# Patient Record
Sex: MALE | Race: WHITE | NOT HISPANIC OR LATINO | Employment: FULL TIME | URBAN - METROPOLITAN AREA
[De-identification: names, ages, dates, MRNs, and addresses within clinical notes are randomized per-mention and may not be internally consistent; named-entity substitution may affect disease eponyms.]

---

## 2017-01-27 ENCOUNTER — APPOINTMENT (OUTPATIENT)
Dept: GENERAL RADIOLOGY | Facility: HOSPITAL | Age: 37
End: 2017-01-27

## 2017-01-27 ENCOUNTER — HOSPITAL ENCOUNTER (EMERGENCY)
Facility: HOSPITAL | Age: 37
Discharge: LEFT BEFORE DISCHARGE | End: 2017-01-27

## 2017-01-27 VITALS
OXYGEN SATURATION: 94 % | SYSTOLIC BLOOD PRESSURE: 147 MMHG | WEIGHT: 275 LBS | HEART RATE: 92 BPM | TEMPERATURE: 97.3 F | BODY MASS INDEX: 33.49 KG/M2 | HEIGHT: 76 IN | RESPIRATION RATE: 20 BRPM | DIASTOLIC BLOOD PRESSURE: 86 MMHG

## 2017-01-27 LAB
ALBUMIN SERPL-MCNC: 4.6 G/DL (ref 3.5–5.2)
ALBUMIN/GLOB SERPL: 1.6 G/DL
ALP SERPL-CCNC: 78 U/L (ref 39–117)
ALT SERPL W P-5'-P-CCNC: 27 U/L (ref 1–41)
ANION GAP SERPL CALCULATED.3IONS-SCNC: 10.2 MMOL/L
AST SERPL-CCNC: 25 U/L (ref 1–40)
BASOPHILS # BLD AUTO: 0.05 10*3/MM3 (ref 0–0.2)
BASOPHILS NFR BLD AUTO: 0.5 % (ref 0–1.5)
BILIRUB SERPL-MCNC: 0.4 MG/DL (ref 0.1–1.2)
BUN BLD-MCNC: 18 MG/DL (ref 6–20)
BUN/CREAT SERPL: 25 (ref 7–25)
CALCIUM SPEC-SCNC: 9.7 MG/DL (ref 8.6–10.5)
CHLORIDE SERPL-SCNC: 104 MMOL/L (ref 98–107)
CO2 SERPL-SCNC: 28.8 MMOL/L (ref 22–29)
CREAT BLD-MCNC: 0.72 MG/DL (ref 0.76–1.27)
DEPRECATED RDW RBC AUTO: 44.8 FL (ref 37–54)
EOSINOPHIL # BLD AUTO: 0.34 10*3/MM3 (ref 0–0.7)
EOSINOPHIL NFR BLD AUTO: 3.4 % (ref 0.3–6.2)
ERYTHROCYTE [DISTWIDTH] IN BLOOD BY AUTOMATED COUNT: 13.3 % (ref 11.5–14.5)
GFR SERPL CREATININE-BSD FRML MDRD: 123 ML/MIN/1.73
GLOBULIN UR ELPH-MCNC: 2.8 GM/DL
GLUCOSE BLD-MCNC: 112 MG/DL (ref 65–99)
HCT VFR BLD AUTO: 43.4 % (ref 40.4–52.2)
HGB BLD-MCNC: 14.7 G/DL (ref 13.7–17.6)
HOLD SPECIMEN: NORMAL
IMM GRANULOCYTES # BLD: 0.03 10*3/MM3 (ref 0–0.03)
IMM GRANULOCYTES NFR BLD: 0.3 % (ref 0–0.5)
LYMPHOCYTES # BLD AUTO: 2.53 10*3/MM3 (ref 0.9–4.8)
LYMPHOCYTES NFR BLD AUTO: 25 % (ref 19.6–45.3)
MCH RBC QN AUTO: 31.3 PG (ref 27–32.7)
MCHC RBC AUTO-ENTMCNC: 33.9 G/DL (ref 32.6–36.4)
MCV RBC AUTO: 92.3 FL (ref 79.8–96.2)
MONOCYTES # BLD AUTO: 0.52 10*3/MM3 (ref 0.2–1.2)
MONOCYTES NFR BLD AUTO: 5.1 % (ref 5–12)
NEUTROPHILS # BLD AUTO: 6.65 10*3/MM3 (ref 1.9–8.1)
NEUTROPHILS NFR BLD AUTO: 65.7 % (ref 42.7–76)
PLATELET # BLD AUTO: 228 10*3/MM3 (ref 140–500)
PMV BLD AUTO: 9.8 FL (ref 6–12)
POTASSIUM BLD-SCNC: 4.3 MMOL/L (ref 3.5–5.2)
PROT SERPL-MCNC: 7.4 G/DL (ref 6–8.5)
RBC # BLD AUTO: 4.7 10*6/MM3 (ref 4.6–6)
SODIUM BLD-SCNC: 143 MMOL/L (ref 136–145)
TROPONIN T SERPL-MCNC: <0.01 NG/ML (ref 0–0.03)
WBC NRBC COR # BLD: 10.12 10*3/MM3 (ref 4.5–10.7)
WHOLE BLOOD HOLD SPECIMEN: NORMAL

## 2017-01-27 PROCEDURE — 36415 COLL VENOUS BLD VENIPUNCTURE: CPT

## 2017-01-27 PROCEDURE — 80053 COMPREHEN METABOLIC PANEL: CPT

## 2017-01-27 PROCEDURE — 84484 ASSAY OF TROPONIN QUANT: CPT

## 2017-01-27 PROCEDURE — 93010 ELECTROCARDIOGRAM REPORT: CPT | Performed by: INTERNAL MEDICINE

## 2017-01-27 PROCEDURE — 71020 HC CHEST PA AND LATERAL: CPT

## 2017-01-27 PROCEDURE — 85025 COMPLETE CBC W/AUTO DIFF WBC: CPT

## 2017-01-27 PROCEDURE — 93005 ELECTROCARDIOGRAM TRACING: CPT

## 2017-01-27 PROCEDURE — 99211 OFF/OP EST MAY X REQ PHY/QHP: CPT

## 2017-01-27 RX ORDER — SODIUM CHLORIDE 0.9 % (FLUSH) 0.9 %
10 SYRINGE (ML) INJECTION AS NEEDED
Status: DISCONTINUED | OUTPATIENT
Start: 2017-01-27 | End: 2017-01-27 | Stop reason: HOSPADM

## 2017-01-27 RX ORDER — ASPIRIN 325 MG
325 TABLET ORAL ONCE
Status: DISCONTINUED | OUTPATIENT
Start: 2017-01-27 | End: 2017-01-27 | Stop reason: HOSPADM

## 2017-02-02 ENCOUNTER — HOSPITAL ENCOUNTER (EMERGENCY)
Facility: HOSPITAL | Age: 37
Discharge: HOME | End: 2017-02-02
Attending: EMERGENCY MEDICINE | Admitting: EMERGENCY MEDICINE

## 2017-02-02 ENCOUNTER — APPOINTMENT (OUTPATIENT)
Dept: GENERAL RADIOLOGY | Facility: HOSPITAL | Age: 37
End: 2017-02-02

## 2017-02-02 VITALS
WEIGHT: 270 LBS | DIASTOLIC BLOOD PRESSURE: 86 MMHG | HEART RATE: 73 BPM | TEMPERATURE: 97.4 F | HEIGHT: 76 IN | BODY MASS INDEX: 32.88 KG/M2 | RESPIRATION RATE: 18 BRPM | SYSTOLIC BLOOD PRESSURE: 123 MMHG | OXYGEN SATURATION: 96 %

## 2017-02-02 DIAGNOSIS — R07.89 ATYPICAL CHEST PAIN: Primary | ICD-10-CM

## 2017-02-02 DIAGNOSIS — R53.1 GENERALIZED WEAKNESS: ICD-10-CM

## 2017-02-02 LAB
ALBUMIN SERPL-MCNC: 4.3 G/DL (ref 3.5–5.2)
ALBUMIN/GLOB SERPL: 1.8 G/DL
ALP SERPL-CCNC: 90 U/L (ref 39–117)
ALT SERPL W P-5'-P-CCNC: 34 U/L (ref 1–41)
ANION GAP SERPL CALCULATED.3IONS-SCNC: 13.6 MMOL/L
AST SERPL-CCNC: 35 U/L (ref 1–40)
BASOPHILS # BLD AUTO: 0.05 10*3/MM3 (ref 0–0.2)
BASOPHILS NFR BLD AUTO: 0.7 % (ref 0–1.5)
BILIRUB SERPL-MCNC: 0.3 MG/DL (ref 0.1–1.2)
BUN BLD-MCNC: 16 MG/DL (ref 6–20)
BUN/CREAT SERPL: 30.2 (ref 7–25)
CALCIUM SPEC-SCNC: 8.7 MG/DL (ref 8.6–10.5)
CHLORIDE SERPL-SCNC: 103 MMOL/L (ref 98–107)
CO2 SERPL-SCNC: 24.4 MMOL/L (ref 22–29)
CREAT BLD-MCNC: 0.53 MG/DL (ref 0.76–1.27)
D DIMER PPP FEU-MCNC: 0.35 MCGFEU/ML (ref 0–0.49)
DEPRECATED RDW RBC AUTO: 41.8 FL (ref 37–54)
EOSINOPHIL # BLD AUTO: 0.27 10*3/MM3 (ref 0–0.7)
EOSINOPHIL NFR BLD AUTO: 3.6 % (ref 0.3–6.2)
ERYTHROCYTE [DISTWIDTH] IN BLOOD BY AUTOMATED COUNT: 12.9 % (ref 11.5–14.5)
GFR SERPL CREATININE-BSD FRML MDRD: >150 ML/MIN/1.73
GLOBULIN UR ELPH-MCNC: 2.4 GM/DL
GLUCOSE BLD-MCNC: 104 MG/DL (ref 65–99)
HCT VFR BLD AUTO: 37.8 % (ref 40.4–52.2)
HGB BLD-MCNC: 13.2 G/DL (ref 13.7–17.6)
HOLD SPECIMEN: NORMAL
HOLD SPECIMEN: NORMAL
IMM GRANULOCYTES # BLD: 0 10*3/MM3 (ref 0–0.03)
IMM GRANULOCYTES NFR BLD: 0 % (ref 0–0.5)
LYMPHOCYTES # BLD AUTO: 2.85 10*3/MM3 (ref 0.9–4.8)
LYMPHOCYTES NFR BLD AUTO: 38.3 % (ref 19.6–45.3)
MCH RBC QN AUTO: 31.3 PG (ref 27–32.7)
MCHC RBC AUTO-ENTMCNC: 34.9 G/DL (ref 32.6–36.4)
MCV RBC AUTO: 89.6 FL (ref 79.8–96.2)
MONOCYTES # BLD AUTO: 0.7 10*3/MM3 (ref 0.2–1.2)
MONOCYTES NFR BLD AUTO: 9.4 % (ref 5–12)
NEUTROPHILS # BLD AUTO: 3.58 10*3/MM3 (ref 1.9–8.1)
NEUTROPHILS NFR BLD AUTO: 48 % (ref 42.7–76)
PLATELET # BLD AUTO: 225 10*3/MM3 (ref 140–500)
PMV BLD AUTO: 10 FL (ref 6–12)
POTASSIUM BLD-SCNC: 3.7 MMOL/L (ref 3.5–5.2)
PROT SERPL-MCNC: 6.7 G/DL (ref 6–8.5)
RBC # BLD AUTO: 4.22 10*6/MM3 (ref 4.6–6)
SODIUM BLD-SCNC: 141 MMOL/L (ref 136–145)
TROPONIN T SERPL-MCNC: <0.01 NG/ML (ref 0–0.03)
WBC NRBC COR # BLD: 7.45 10*3/MM3 (ref 4.5–10.7)
WHOLE BLOOD HOLD SPECIMEN: NORMAL
WHOLE BLOOD HOLD SPECIMEN: NORMAL

## 2017-02-02 PROCEDURE — 84484 ASSAY OF TROPONIN QUANT: CPT | Performed by: EMERGENCY MEDICINE

## 2017-02-02 PROCEDURE — 71020 HC CHEST PA AND LATERAL: CPT

## 2017-02-02 PROCEDURE — 25010000002 KETOROLAC TROMETHAMINE PER 15 MG: Performed by: EMERGENCY MEDICINE

## 2017-02-02 PROCEDURE — 96374 THER/PROPH/DIAG INJ IV PUSH: CPT

## 2017-02-02 PROCEDURE — 85025 COMPLETE CBC W/AUTO DIFF WBC: CPT | Performed by: EMERGENCY MEDICINE

## 2017-02-02 PROCEDURE — 99284 EMERGENCY DEPT VISIT MOD MDM: CPT

## 2017-02-02 PROCEDURE — 93010 ELECTROCARDIOGRAM REPORT: CPT | Performed by: INTERNAL MEDICINE

## 2017-02-02 PROCEDURE — 93005 ELECTROCARDIOGRAM TRACING: CPT

## 2017-02-02 PROCEDURE — 85379 FIBRIN DEGRADATION QUANT: CPT | Performed by: EMERGENCY MEDICINE

## 2017-02-02 PROCEDURE — 80053 COMPREHEN METABOLIC PANEL: CPT | Performed by: EMERGENCY MEDICINE

## 2017-02-02 RX ORDER — KETOROLAC TROMETHAMINE 30 MG/ML
30 INJECTION, SOLUTION INTRAMUSCULAR; INTRAVENOUS ONCE
Status: COMPLETED | OUTPATIENT
Start: 2017-02-02 | End: 2017-02-02

## 2017-02-02 RX ORDER — ASPIRIN 325 MG
325 TABLET ORAL ONCE
Status: DISCONTINUED | OUTPATIENT
Start: 2017-02-02 | End: 2017-02-02

## 2017-02-02 RX ORDER — SODIUM CHLORIDE 0.9 % (FLUSH) 0.9 %
10 SYRINGE (ML) INJECTION AS NEEDED
Status: DISCONTINUED | OUTPATIENT
Start: 2017-02-02 | End: 2017-02-02 | Stop reason: HOSPADM

## 2017-02-02 RX ORDER — ASPIRIN 81 MG/1
81 TABLET, CHEWABLE ORAL DAILY
COMMUNITY
End: 2019-01-12 | Stop reason: SDUPTHER

## 2017-02-02 RX ADMIN — KETOROLAC TROMETHAMINE 30 MG: 30 INJECTION, SOLUTION INTRAMUSCULAR at 05:34

## 2017-02-02 RX ADMIN — Medication 10 ML: at 05:36

## 2017-02-02 NOTE — ED PROVIDER NOTES
" EMERGENCY DEPARTMENT ENCOUNTER    CHIEF COMPLAINT  Chief Complaint: Chest pain  History given by: patient  History limited by: n/a  Room Number: 12/12  PMD: Rosenberg Acosta Reyes, MD      HPI:  Pt is a 36 y.o. male who presents complaining of chest began about 4 hours ago. Pt state that that pain is worse with deep inspiration. Pt states that he took NTG x3 and 324 mg of ASA prior to arrival. Pt also complains of fatigue and SOA. He describes the SOA as the inability to take a deep breath. Pt states that he has chronic chest pain, but the pain tonight is different. Pt reports a hx of cardiomegaly and aortic valve insuffiencey.     Duration:  4 hours  Onset: sudden  Timing: contant  Location: left chest  Radiation: none  Quality: \"pain\"  Intensity/Severity: moderate   Progression: unchanged   Associated Symptoms: Fatgue   Aggravating Factors: none  Alleviating Factors: none  Previous Episodes: chronic chest pain  Treatment before arrival: none    PAST MEDICAL HISTORY  Active Ambulatory Problems     Diagnosis Date Noted   • No Active Ambulatory Problems     Resolved Ambulatory Problems     Diagnosis Date Noted   • No Resolved Ambulatory Problems     Past Medical History   Diagnosis Date   • Aortic valve insufficiency    • Bipolar disorder    • Cardiomegaly    • Murmur        PAST SURGICAL HISTORY  Past Surgical History   Procedure Laterality Date   • No past surgeries         FAMILY HISTORY  History reviewed. No pertinent family history.    SOCIAL HISTORY  Social History     Social History   • Marital status:      Spouse name: N/A   • Number of children: N/A   • Years of education: N/A     Occupational History   • Not on file.     Social History Main Topics   • Smoking status: Current Every Day Smoker     Packs/day: 0.50     Types: Cigarettes   • Smokeless tobacco: Not on file   • Alcohol use No   • Drug use: No   • Sexual activity: Defer     Other Topics Concern   • Not on file     Social History Narrative "       ALLERGIES  Review of patient's allergies indicates no known allergies.    REVIEW OF SYSTEMS  Review of Systems   Constitutional: Positive for fatigue. Negative for chills and fever.   HENT: Negative for congestion and sore throat.    Eyes: Negative.    Respiratory: Negative for cough and shortness of breath.    Cardiovascular: Positive for chest pain. Negative for leg swelling.   Gastrointestinal: Negative for abdominal pain, diarrhea and vomiting.   Genitourinary: Negative for difficulty urinating and dysuria.   Musculoskeletal: Negative for back pain and neck pain.   Skin: Negative for rash and wound.   Allergic/Immunologic: Negative.    Neurological: Negative for dizziness, weakness, numbness and headaches.   Psychiatric/Behavioral: Negative.    All other systems reviewed and are negative.      PHYSICAL EXAM  ED Triage Vitals   Temp Heart Rate Resp BP SpO2   02/02/17 0428 02/02/17 0418 02/02/17 0418 02/02/17 0418 02/02/17 0418   97.4 °F (36.3 °C) 93 18 138/89 95 %      Temp src Heart Rate Source Patient Position BP Location FiO2 (%)   02/02/17 0428 02/02/17 0418 02/02/17 0418 02/02/17 0418 --   Tympanic Monitor Lying Right arm        Physical Exam   Constitutional: He is oriented to person, place, and time and well-developed, well-nourished, and in no distress.   HENT:   Head: Normocephalic and atraumatic.   Eyes: EOM are normal. Pupils are equal, round, and reactive to light.   Neck: Normal range of motion. Neck supple.   Cardiovascular: Normal rate, regular rhythm and normal heart sounds.    Pulmonary/Chest: Effort normal and breath sounds normal. No respiratory distress.   Abdominal: Soft. There is no tenderness. There is no rebound and no guarding.   Musculoskeletal: Normal range of motion. He exhibits no edema.   Neurological: He is alert and oriented to person, place, and time. He has normal sensation and normal strength.   Skin: Skin is warm and dry.   Psychiatric: Mood and affect normal.   Nursing  note and vitals reviewed.      LAB RESULTS  Lab Results (last 24 hours)     Procedure Component Value Units Date/Time    CBC & Differential [[id number]] Collected:  02/02/17 0423    Specimen:  Blood Updated:  02/02/17 0441    Narrative:       The following orders were created for panel order CBC & Differential.  Procedure                               Abnormality         Status                     ---------                               -----------         ------                     CBC Auto Differential[[id number]]         Abnormal            Final result                 Please view results for these tests on the individual orders.    Comprehensive Metabolic Panel [[id number]]  (Abnormal) Collected:  02/02/17 0423    Specimen:  Blood Updated:  02/02/17 0458     Glucose 104 (H) mg/dL      BUN 16 mg/dL      Creatinine 0.53 (L) mg/dL      Sodium 141 mmol/L      Potassium 3.7 mmol/L      Chloride 103 mmol/L      CO2 24.4 mmol/L      Calcium 8.7 mg/dL      Total Protein 6.7 g/dL      Albumin 4.30 g/dL      ALT (SGPT) 34 U/L      AST (SGOT) 35 U/L      Alkaline Phosphatase 90 U/L      Total Bilirubin 0.3 mg/dL      eGFR Non African Amer >150 mL/min/1.73      Globulin 2.4 gm/dL      A/G Ratio 1.8 g/dL      BUN/Creatinine Ratio 30.2 (H)      Anion Gap 13.6 mmol/L     Troponin [[id number]]  (Normal) Collected:  02/02/17 0423    Specimen:  Blood Updated:  02/02/17 0458     Troponin T <0.010 ng/mL     Narrative:       Troponin T Reference Ranges:  Less than 0.03 ng/mL:    Negative for AMI  0.03 to 0.09 ng/mL:      Indeterminant for AMI  Greater than 0.09 ng/mL: Positive for AMI    CBC Auto Differential [[id number]]  (Abnormal) Collected:  02/02/17 0423    Specimen:  Blood Updated:  02/02/17 0441     WBC 7.45 10*3/mm3      RBC 4.22 (L) 10*6/mm3      Hemoglobin 13.2 (L) g/dL      Hematocrit 37.8 (L) %      MCV 89.6 fL      MCH 31.3 pg      MCHC 34.9 g/dL      RDW 12.9 %      RDW-SD 41.8 fl      MPV 10.0 fL      Platelets 225 10*3/mm3       Neutrophil % 48.0 %      Lymphocyte % 38.3 %      Monocyte % 9.4 %      Eosinophil % 3.6 %      Basophil % 0.7 %      Immature Grans % 0.0 %      Neutrophils, Absolute 3.58 10*3/mm3      Lymphocytes, Absolute 2.85 10*3/mm3      Monocytes, Absolute 0.70 10*3/mm3      Eosinophils, Absolute 0.27 10*3/mm3      Basophils, Absolute 0.05 10*3/mm3      Immature Grans, Absolute 0.00 10*3/mm3     D-dimer, Quantitative [[id number]]  (Normal) Collected:  02/02/17 0423    Specimen:  Blood Updated:  02/02/17 0523     D-Dimer, Quantitative 0.35 MCGFEU/mL     Narrative:       The Stago D-Dimer test has been cleared by the FDA for the exclusion of pulmonary embolism (PE). The Stago D-Dimer test, along with clinical findings, may be used to aid in the diagnosis of deep vein thrombosis (DVT).          I ordered the above labs and reviewed the results    RADIOLOGY  XR Chest 2 View   Preliminary Result   No acute findings.                    I ordered the above noted radiological studies. Interpreted by radiologist. Reviewed by me in PACS.       PROCEDURES  Procedures    EKG           EKG time: 04:11  Rhythm/Rate: NSR 95  P waves and MI: nml  QRS, axis: nml   ST and T waves: nml     Interpreted Contemporaneously by me, independently viewed  Unchanged compared to prior 1/27/17    PROGRESS AND CONSULTS  ED Course     04:23  Labs, CXR, and EKG ordered for further evaluation.     05:05  Ddimer ordered for further evaluation. Toradol ordered to treat pain.      05:55  Rechecked the patient who is in NAD and is resting comfortably. Advised pt that the workup in the ED shows NAD. Advised pt to f/u with Dr Reyes. Pt will be discharged. Pt understands and agrees with the plan, all questions answered.    MEDICAL DECISION MAKING  Results were reviewed/discussed with the patient and they were also made aware of online access. Pt also made aware that some labs, such as cultures, will not be resulted during ER visit and follow up with PMD is  necessary.     MDM  Number of Diagnoses or Management Options     Amount and/or Complexity of Data Reviewed  Clinical lab tests: reviewed and ordered (Troponin is <0.010, Ddimer is 0.35)  Tests in the radiology section of CPT®: reviewed and ordered (CXR shows NAD)  Tests in the medicine section of CPT®: ordered and reviewed (See EKG procedure note. )  Decide to obtain previous medical records or to obtain history from someone other than the patient: yes  Review and summarize past medical records: yes (Pt was last seen and dx with atypical chest pain in September 2016. )  Independent visualization of images, tracings, or specimens: yes    Patient Progress  Patient progress: stable         DIAGNOSIS  Final diagnoses:   Atypical chest pain   Generalized weakness       DISPOSITION  DISCHARGE    Patient discharged in stable condition.    Reviewed implications of results, diagnosis, meds, responsibility to follow up, warning signs and symptoms of possible worsening, potential complications and reasons to return to ER.    Patient/Family voiced understanding of above instructions.    Discussed plan for discharge, as there is no emergent indication for admission.  Pt/family is agreeable and understands need for follow up and repeat testing.  Pt is aware that discharge does not mean that nothing is wrong but it indicates no emergency is present that requires admission and they must continue care with follow-up as given below or physician of their choice.     FOLLOW-UP  Rosenberg Acosta Reyes, MD  [address] [place], [place] [id number] [phone]    Schedule an appointment as soon as possible for a visit           Medication List      Stop          CITALOPRAM HYDROBROMIDE PO       cyclobenzaprine 10 MG tablet   Commonly known as:  FLEXERIL       HYDROcodone-acetaminophen 5-325 MG per tablet   Commonly known as:  NORCO       LAMOTRIGINE PO       NON FORMULARY       PANTOPRAZOLE SODIUM PO           Latest Documented Vital  Signs:  As of 6:26 AM  BP- 123/86 HR- 73 Temp- 97.4 °F (36.3 °C) (Tympanic) O2 sat- 96%    --  Documentation assistance provided by bogdan Berger for Dr Corey.  Information recorded by the scribe was done at my direction and has been verified and validated by me.        Marisa Berger  02/02/17 0603       Gera Corey MD  02/02/17 0626

## 2017-02-02 NOTE — ED NOTES
L upper Chest pain radiating to L shoulder 8/10. 3 nitro tablets and 4 81mg ASA prior to arrival.        Narda Howard RN  02/02/17 0428

## 2017-04-11 ENCOUNTER — HOSPITAL ENCOUNTER (EMERGENCY)
Facility: HOSPITAL | Age: 37
Discharge: HOME | End: 2017-04-11
Attending: EMERGENCY MEDICINE | Admitting: EMERGENCY MEDICINE

## 2017-04-11 ENCOUNTER — APPOINTMENT (OUTPATIENT)
Dept: GENERAL RADIOLOGY | Facility: HOSPITAL | Age: 37
End: 2017-04-11

## 2017-04-11 VITALS
TEMPERATURE: 97.3 F | HEIGHT: 76 IN | HEART RATE: 102 BPM | SYSTOLIC BLOOD PRESSURE: 148 MMHG | RESPIRATION RATE: 16 BRPM | WEIGHT: 300 LBS | BODY MASS INDEX: 36.53 KG/M2 | OXYGEN SATURATION: 98 % | DIASTOLIC BLOOD PRESSURE: 90 MMHG

## 2017-04-11 DIAGNOSIS — S82.842A BIMALLEOLAR FRACTURE, LEFT, CLOSED, INITIAL ENCOUNTER: Primary | ICD-10-CM

## 2017-04-11 DIAGNOSIS — S82.863A MAISONNEUVE FRACTURE: ICD-10-CM

## 2017-04-11 PROCEDURE — 96372 THER/PROPH/DIAG INJ SC/IM: CPT

## 2017-04-11 PROCEDURE — 73590 X-RAY EXAM OF LOWER LEG: CPT

## 2017-04-11 PROCEDURE — 25010000002 HYDROMORPHONE PER 4 MG: Performed by: EMERGENCY MEDICINE

## 2017-04-11 PROCEDURE — 99284 EMERGENCY DEPT VISIT MOD MDM: CPT

## 2017-04-11 PROCEDURE — 25010000002 PROMETHAZINE PER 50 MG: Performed by: EMERGENCY MEDICINE

## 2017-04-11 PROCEDURE — 73610 X-RAY EXAM OF ANKLE: CPT

## 2017-04-11 RX ORDER — PROMETHAZINE HYDROCHLORIDE 25 MG/ML
12.5 INJECTION, SOLUTION INTRAMUSCULAR; INTRAVENOUS ONCE
Status: COMPLETED | OUTPATIENT
Start: 2017-04-11 | End: 2017-04-11

## 2017-04-11 RX ORDER — HYDROMORPHONE HCL 110MG/55ML
2 PATIENT CONTROLLED ANALGESIA SYRINGE INTRAVENOUS ONCE
Status: COMPLETED | OUTPATIENT
Start: 2017-04-11 | End: 2017-04-11

## 2017-04-11 RX ORDER — ONDANSETRON 4 MG/1
4 TABLET, ORALLY DISINTEGRATING ORAL EVERY 8 HOURS PRN
Qty: 15 TABLET | Refills: 0 | Status: SHIPPED | OUTPATIENT
Start: 2017-04-11 | End: 2017-04-21

## 2017-04-11 RX ORDER — OXYCODONE HYDROCHLORIDE AND ACETAMINOPHEN 5; 325 MG/1; MG/1
1 TABLET ORAL EVERY 6 HOURS PRN
Qty: 15 TABLET | Refills: 0 | Status: SHIPPED | OUTPATIENT
Start: 2017-04-11 | End: 2017-04-21 | Stop reason: SDUPTHER

## 2017-04-11 RX ADMIN — PROMETHAZINE HYDROCHLORIDE 12.5 MG: 25 INJECTION, SOLUTION INTRAMUSCULAR; INTRAVENOUS at 20:11

## 2017-04-11 RX ADMIN — HYDROMORPHONE HYDROCHLORIDE 2 MG: 2 INJECTION, SOLUTION INTRAMUSCULAR; INTRAVENOUS; SUBCUTANEOUS at 20:11

## 2017-04-11 NOTE — ED PROVIDER NOTES
EMERGENCY DEPARTMENT ENCOUNTER    CHIEF COMPLAINT  Chief Complaint: Ankle Pain  History given by: Patient  History limited by: Nothing  Room Number: 06/06  PMD: Rosenberg Acosta Reyes, MD      HPI:  Pt is a 36 y.o. male who presents to the ED via EMS s/p mechanical fall down 2-3 stairs PTA c/o left ankle pain with obvious swelling. The patient reports that he simply missed a step causing him to trip and fall down the stairs and twist his ankle. He denies any dizziness or lightheadedness prior to the fall. Patient notes that he also has tenderness of his proximal tib/fib with palapation. He states that the pain of his ankle is exacerbated with palpation and he has not ambulated since the accident occurred. Patient reports that he also hit has left elbow upon impact up denies any bony tenderness. No other complaints at this time.      Duration:  1-2 seconds   Onset: Sudden  Timing: Constant  Location: Right ankle  Radiation: None  Quality: Sharp  Intensity/Severity: Moderate  Progression: No Change  Associated Symptoms: Arthralgias, gait issue  Aggravating Factors: Palapation  Alleviating Factors: Rest  Previous Episodes: None  Treatment before arrival: None    PAST MEDICAL HISTORY  Active Ambulatory Problems     Diagnosis Date Noted   • No Active Ambulatory Problems     Resolved Ambulatory Problems     Diagnosis Date Noted   • No Resolved Ambulatory Problems     Past Medical History:   Diagnosis Date   • Aortic valve insufficiency    • Bipolar disorder    • Cardiomegaly    • Murmur        PAST SURGICAL HISTORY  Past Surgical History:   Procedure Laterality Date   • NO PAST SURGERIES         FAMILY HISTORY  History reviewed. No pertinent family history.    SOCIAL HISTORY  Social History     Social History   • Marital status:      Spouse name: N/A   • Number of children: N/A   • Years of education: N/A     Occupational History   • Not on file.     Social History Main Topics   • Smoking status: Current Every  Day Smoker     Packs/day: 0.50     Types: Cigarettes   • Smokeless tobacco: Not on file   • Alcohol use Yes      Comment: today   • Drug use: No   • Sexual activity: Defer     Other Topics Concern   • Not on file     Social History Narrative   • No narrative on file       ALLERGIES  Review of patient's allergies indicates no known allergies.    REVIEW OF SYSTEMS  Review of Systems   Constitutional: Negative.  Negative for activity change, appetite change, chills and fever.   HENT: Negative for congestion, ear pain, rhinorrhea, sinus pressure and sore throat.    Eyes: Negative.  Negative for pain.   Respiratory: Negative.  Negative for cough and shortness of breath.    Cardiovascular: Negative.  Negative for chest pain, palpitations and leg swelling.   Gastrointestinal: Negative for abdominal pain, diarrhea, nausea and vomiting.   Endocrine: Negative.    Genitourinary: Negative.  Negative for decreased urine volume, difficulty urinating, dysuria, flank pain, frequency and urgency.   Musculoskeletal: Positive for arthralgias (L Elbow), gait problem and joint swelling. Negative for back pain, myalgias, neck pain and neck stiffness.   Skin: Negative.  Negative for rash.   Allergic/Immunologic: Negative.    Neurological: Negative for dizziness, speech difficulty, weakness, light-headedness, numbness and headaches.   Hematological: Negative.    Psychiatric/Behavioral: Negative.  The patient is not nervous/anxious.    All other systems reviewed and are negative.      PHYSICAL EXAM  ED Triage Vitals   Temp Heart Rate Resp BP SpO2   04/11/17 1920 04/11/17 1920 04/11/17 1920 04/11/17 1920 04/11/17 1920   98.3 °F (36.8 °C) 81 20 147/80 97 %      Temp src Heart Rate Source Patient Position BP Location FiO2 (%)   -- 04/11/17 1920 -- -- --    Monitor          Physical Exam   Constitutional: He is oriented to person, place, and time and well-developed, well-nourished, and in no distress.   HENT:   Head: Normocephalic and  atraumatic.   Eyes: EOM are normal. Pupils are equal, round, and reactive to light.   Neck: Normal range of motion. Neck supple.   Cardiovascular: Normal rate, regular rhythm and normal heart sounds.    Good pulse of LLE.   Pulmonary/Chest: Effort normal and breath sounds normal. No respiratory distress.   Abdominal: Soft. There is no tenderness. There is no rebound and no guarding.   Musculoskeletal: He exhibits no edema.        Left ankle: He exhibits decreased range of motion (Secondary to pain), swelling and deformity. Tenderness.   Tenderness of the proximal tib/fib with squeeze.    Neurological: He is alert and oriented to person, place, and time. He has normal sensation and normal strength.   Skin: Skin is warm and dry.   Psychiatric: Mood and affect normal.   Nursing note and vitals reviewed.      RADIOLOGY  XR Ankle 3+ View Left   Final Result       Fractures of the distal tibia (medially and posteriorly), talar dome   (laterally), and proximal fibular metaphysis. Lateral subluxation of the   talus in relation to the tibia.       This report was finalized on 4/11/2017 8:42 PM by Dr. Scott Arteaga MD.          XR Tibia Fibula 2 View Left   Final Result       Fractures of the distal tibia (medially and posteriorly), talar dome   (laterally), and proximal fibular metaphysis. Lateral subluxation of the   talus in relation to the tibia.       This report was finalized on 4/11/2017 8:42 PM by Dr. Scott Arteaga MD.             I ordered the above noted radiological studies. Interpreted by radiologist. Reviewed by me in PACS.       PROCEDURES  Splint Application  Date/Time: 4/11/2017 8:48 PM  Performed by: TREVOR NELSON III  Authorized by: JUAN ANTONIO MONTE   Consent: Verbal consent obtained.  Consent given by: patient  Patient understanding: patient states understanding of the procedure being performed  Patient consent: the patient's understanding of the procedure matches consent  given  Patient identity confirmed: verbally with patient  Location details: left leg  Splint type: ankle stirrup (Posterior)  Supplies used: Ortho-Glass  Post-procedure: The splinted body part was neurovascularly unchanged following the procedure.        PROGRESS AND CONSULTS  ED Course     19:59  Ordered XR L Ankle and XR L Tib/fib for further evaluation. Also ordered Phenergan for nausea and Dilaudid for pain control.     20:46  Rechecked patient and they are resting. Discussed pertinent lab and imaging results, including his XR's show three different fractures. Discussed the plan to splint the patient's LLE. Patient agrees with plan and all questions were addressed.     21:54  Rechecked patient and they are sleeping comfortably. Vitals are stable. I explained that I am going to consult the Orthopedist on-call. Patient agrees with plan and all questions were addressed.    22:05  Discussed case with  and he agrees with the treatment plan.     22:07  Rechecked patient and they are resting comfortably after the application of the splint. Discussed the patient's pertinent labs and imaging results, including XR Tib/fib and ankle show fracture of the distal tibia talar dome and proximal fibula. Discussed plan to discharge the patient home and recommended follow up with the recommended Orthopedist as directed. Patient agrees with the plan and all questions were addressed.     Latest vital signs   BP- 139/61 HR- 96 Temp- 98.3 °F (36.8 °C) O2 sat- 100%    22:40  Discussed case with  (Ortho). Reviewed history, exam, results and treatments.  Discussed concerns and plan of care.       MEDICAL DECISION MAKING  Results were reviewed/discussed with the patient and they were also made aware of online access. Pt also made aware that some labs, such as cultures, will not be resulted during ER visit and follow up with PMD is necessary.     MDM  Number of Diagnoses or Management Options     Amount and/or  Complexity of Data Reviewed  Tests in the radiology section of CPT®: ordered and reviewed    Patient Progress  Patient progress: stable         DIAGNOSIS  Final diagnoses:   Bimalleolar fracture, left, closed, initial encounter   Magaryuve fracture       DISPOSITION  DISCHARGE    Patient discharged in stable condition.    Reviewed implications of results, diagnosis, meds, responsibility to follow up, warning signs and symptoms of possible worsening, potential complications and reasons to return to ER, including worsening pain.     Patient/Family voiced understanding of above instructions.    Discussed plan for discharge, as there is no emergent indication for admission.  Pt/family is agreeable and understands need for follow up and repeat testing.  Pt is aware that discharge does not mean that nothing is wrong but it indicates no emergency is present that requires admission and they must continue care with follow-up as given below or physician of their choice.     FOLLOW-UP  Andrew Ag MD  [address] [id number] [phone]    Schedule an appointment as soon as possible for a visit  For further evaluation and treatment         Medication List      New Prescriptions          ondansetron ODT 4 MG disintegrating tablet   Commonly known as:  ZOFRAN ODT   Take 1 tablet by mouth Every 8 (Eight) Hours As Needed for Nausea (Take   with pain medication if it makes you nauseated).       oxyCODONE-acetaminophen 5-325 MG per tablet   Commonly known as:  PERCOCET   Take 1 tablet by mouth Every 6 (Six) Hours As Needed for Severe Pain   (7-10).         Stop          HYDROcodone-acetaminophen 5-325 MG per tablet   Commonly known as:  NORCO               Latest Documented Vital Signs:  As of 10:07 PM  BP- 139/61 HR- 96 Temp- 98.3 °F (36.8 °C) O2 sat- 100%    --  Documentation assistance provided by bogdan Bentley for Fracisco Cantu PA-C.  Information recorded by the bogdan was done at my direction  and has been verified and validated by me.                Jan Bentley  04/11/17 2244       LILY Schreiber III  04/12/17 [id number]

## 2017-04-12 ENCOUNTER — TELEPHONE (OUTPATIENT)
Dept: ORTHOPEDIC SURGERY | Facility: CLINIC | Age: 37
End: 2017-04-12

## 2017-04-12 NOTE — ED PROVIDER NOTES
Pt presents tot e ED c/o mechanical fall earlier today w/ subsequent L ankle pain. Upon exam, pt has swelling over the medial malleolus and tenderness to the lateral proximal fibula. He is neurovascularly intact distally. Imaging shows fx of the distal tibia (medially and posteriorly), talar dome (laterally), and proximal fibular metaphysis. Lateral subluxation of the talus in relation to the tibia. I agree with the plan for splint application prior to discharge.    I supervised care provided by the midlevel provider.    We have discussed this patient's history, physical exam, and treatment plan.   I have reviewed the note and personally saw and examined the patient and agree with the plan of care.    Documentation assistance provided by bogdan Murdock for Dr. Gomes.  Information recorded by the scribe was done at my direction and has been verified and validated by me.     Robert Murdock  04/11/17 1164       Byron Gomes MD  04/11/17 9594

## 2017-04-14 ENCOUNTER — OFFICE VISIT (OUTPATIENT)
Dept: ORTHOPEDIC SURGERY | Facility: CLINIC | Age: 37
End: 2017-04-14

## 2017-04-14 VITALS — BODY MASS INDEX: 35.31 KG/M2 | HEIGHT: 76 IN | TEMPERATURE: 98.3 F | WEIGHT: 290 LBS

## 2017-04-14 DIAGNOSIS — S82.892A CLOSED LEFT ANKLE FRACTURE, INITIAL ENCOUNTER: Primary | ICD-10-CM

## 2017-04-14 PROCEDURE — 99204 OFFICE O/P NEW MOD 45 MIN: CPT | Performed by: ORTHOPAEDIC SURGERY

## 2017-04-14 RX ORDER — DOCUSATE SODIUM 100 MG/1
100 CAPSULE, LIQUID FILLED ORAL 2 TIMES DAILY
Qty: 60 CAPSULE | Refills: 0 | Status: SHIPPED | OUTPATIENT
Start: 2017-04-14 | End: 2019-01-12

## 2017-04-14 RX ORDER — OXYCODONE HYDROCHLORIDE AND ACETAMINOPHEN 5; 325 MG/1; MG/1
2 TABLET ORAL EVERY 4 HOURS PRN
Qty: 50 TABLET | Refills: 0 | Status: SHIPPED | OUTPATIENT
Start: 2017-04-14 | End: 2017-04-19 | Stop reason: SDUPTHER

## 2017-04-16 RX ORDER — SODIUM CHLORIDE 0.9 % (FLUSH) 0.9 %
1-10 SYRINGE (ML) INJECTION AS NEEDED
Status: CANCELLED | OUTPATIENT
Start: 2017-04-16

## 2017-04-16 RX ORDER — CEFAZOLIN SODIUM 2 G/100ML
2 INJECTION, SOLUTION INTRAVENOUS ONCE
Status: CANCELLED | OUTPATIENT
Start: 2017-04-16 | End: 2017-04-16

## 2017-04-16 NOTE — PROGRESS NOTES
History & Physical       Patient: Evaristo Scherer    YOB: 1980    Medical Record Number: [id number]    Attending Physician: No att. providers found    Chief Complaints: Left ankle injury    History of Present Illness: 36 y.o. male presents for evaluation of his left ankle.  He injured the ankle on April 11, twisting it awkwardly.  He noticed immediate pain and deformity.  He was seen in the emergency room and placed in a splint.  He describes his pain as moderate to severe, constant and aching.  His current splint is quite uncomfortable.  Rest and elevation have helped somewhat.  Denies any other injuries or complaints.  He did not suffer loss of consciousness in the fall.  He had normal use of the ankle and mobility prior to this injury.    Allergies: No Known Allergies    Home Medications:      Current Outpatient Prescriptions:   •  ALPRAZolam (XANAX) 0.25 MG tablet, Take 1 mg by mouth 3 (Three) Times a Day As Needed for anxiety., Disp: , Rfl:   •  aspirin 81 MG chewable tablet, Chew 81 mg Daily., Disp: , Rfl:   •  Carvedilol (COREG PO), Take 10 mg by mouth., Disp: , Rfl:   •  CITALOPRAM HYDROBROMIDE PO, Take  by mouth., Disp: , Rfl:   •  cyclobenzaprine (FLEXERIL) 10 MG tablet, Take 1 tablet by mouth 3 (three) times a day as needed for muscle spasms., Disp: 20 tablet, Rfl: 0  •  HYDROcodone-acetaminophen (NORCO) 5-325 MG per tablet, Take 1 tablet by mouth every 6 (six) hours as needed for moderate pain (4-6)., Disp: 20 tablet, Rfl: 0  •  HYDROcodone-acetaminophen (NORCO) 5-325 MG per tablet, Take 1 tablet by mouth every 6 (six) hours as needed for moderate pain (4-6)., Disp: 15 tablet, Rfl: 0  •  LAMOTRIGINE PO, Take  by mouth., Disp: , Rfl:   •  NON FORMULARY, Pt started on a new blood pressure medication last week, unsure of the name, Disp: , Rfl:   •  ondansetron ODT (ZOFRAN ODT) 4 MG disintegrating tablet, Take 1 tablet by mouth Every 8 (Eight) Hours As Needed for Nausea (Take with pain  medication if it makes you nauseated)., Disp: 15 tablet, Rfl: 0  •  oxyCODONE-acetaminophen (PERCOCET) 5-325 MG per tablet, Take 1 tablet by mouth Every 6 (Six) Hours As Needed for Severe Pain (7-10)., Disp: 15 tablet, Rfl: 0  •  PANTOPRAZOLE SODIUM PO, Take  by mouth., Disp: , Rfl:   •  docusate sodium (COLACE) 100 MG capsule, Take 1 capsule by mouth 2 (Two) Times a Day., Disp: 60 capsule, Rfl: 0  •  oxyCODONE-acetaminophen (PERCOCET) 5-325 MG per tablet, Take 2 tablets by mouth Every 4 (Four) Hours As Needed for Moderate Pain (4-6)., Disp: 50 tablet, Rfl: 0    Past Medical History:   Diagnosis Date   • Aortic valve insufficiency    • Bipolar disorder    • Cardiomegaly    • Murmur           Past Surgical History:   Procedure Laterality Date   • NO PAST SURGERIES            Social History     Occupational History   • Not on file.     Social History Main Topics   • Smoking status: Current Every Day Smoker     Packs/day: 0.50     Types: Cigarettes   • Smokeless tobacco: Not on file   • Alcohol use Yes      Comment: today   • Drug use: No   • Sexual activity: Defer      Social History     Social History Narrative        History reviewed. No pertinent family history.    Review of Systems:      Constitutional: Denies fever, shaking or chills   Eyes: Denies change in visual acuity   HEENT: Denies nasal congestion or sore throat   Respiratory: Denies cough or shortness of breath   Cardiovascular: Denies chest pain or edema  Endocrine: Denies tremors, palpitations, intolerance of heat or cold, polyuria, polydipsia.  GI: Denies abdominal pain, nausea, vomiting, bloody stools or diarrhea  : Denies frequency, urgency, incontinence, retention, or nocturia.  Musculoskeletal: Denies numbness tingling or loss of motor function except as above  Integument: Denies rash, lesion or ulceration   Neurologic: Denies headache or focal weakness, deficits  Heme: Denies epistaxis, spontaneous or excessive bleeding, epistaxis, hematuria,  "melena, fatigue, enlarged or tender lymph nodes.      All other pertinent positives and negatives as noted above in HPI.    Physical Exam: 36 y.o. male  Vitals:    04/14/17 0748   Temp: 98.3 °F (36.8 °C)   Weight: 290 lb (132 kg)   Height: 76\" (193 cm)       General:  Patient is awake and alert.  Appears in no acute distress or discomfort.    Psych:  Affect and demeanor are appropriate.    Eyes:  Conjunctiva and sclera appear grossly normal.  Eyes track well and EOM seem to be intact.    Dentition:  No gross abnormalities noted.    Ears:  No gross abnormalities.  Hearing adequate for the exam.    Cardiovascular:  Regular rate and rhythm.    Lungs:  Good chest expansion.  Breathing unlabored.    Lymph:  No palpable masses or adenopathy in the affected extremity    Left lower extremity:  Splint was in place and removed.  Skin demonstrates multiple fracture blisters both medially and posteriorly.  No gross malalignment, lacerations or abrasions.  Focal tenderness noted diffusely about the ankle.  The lateral side is minimally tender though.  Moderate tenderness over the fibular neck.  No palpable masses or adenopathy.  Compartments soft.  Painful, limited ROM of the ankle.  No tenderness noted over the foot or toes.  He can plantar flex and dorsiflex the toes weakly without discomfort.  Intact sensation.  Brisk cap refill.    Diagnostic Tests:  Lab Results   Component Value Date    GLUCOSE 104 (H) 02/02/2017    CALCIUM 8.7 02/02/2017     02/02/2017    K 3.7 02/02/2017    CO2 24.4 02/02/2017     02/02/2017    BUN 16 02/02/2017    CREATININE 0.53 (L) 02/02/2017    EGFRIFAFRI  09/23/2016      Comment:      <15 Indicative of kidney failure.    EGFRIFNONA >150 02/02/2017    BCR 30.2 (H) 02/02/2017    ANIONGAP 13.6 02/02/2017     Lab Results   Component Value Date    WBC 7.45 02/02/2017    HGB 13.2 (L) 02/02/2017    HCT 37.8 (L) 02/02/2017    MCV 89.6 02/02/2017     02/02/2017     Lab Results   Component " Value Date    INR 0.97 09/23/2016    INR 0.97 06/16/2016    INR 1.0 02/04/2016    PROTIME 12.6 09/23/2016    PROTIME 12.7 06/16/2016    PROTIME 13.4 02/04/2016     Imaging:  AP, mortise, and lateral views of the ankle are reviewed on the [facility] system along with the associated report.  I've also reviewed AP and lateral views of the tibia and fibula which are also available on the [facility] system.  The x-rays show what appears to be a Maisoneuve-type injury of the left ankle.  There is a minimally displaced fibular neck fracture, widening of the tib-fib fib and syndesmosis, a displaced medial malleolar fracture and a nondisplaced posterior malleolar fracture.  There is an impaction defect in the talus as well.    Assessment:  Unstable left ankle fracture    Plan:  We had a thorough discussion regarding the patient's options and the risks of non-surgical management versus surgery.  I explained that surgical risks include infection, hematoma, hardware related complications including failure of fixation, cutout, nonunion, malunion, persistent pain and/or loss of motion, iatrogenic nerve and/or blood vessel injury resulting in permanent weakness, numbness or dysfunction, RSD, DVT, PE, positioning related neuropraxia, and anesthesia related complications resulting in death.  With closed treatment, there is the risk of further displacement, malunion, nonunion or persistent pain or loss of motion/function that could require further intervention in the future.      I feel that this injury warrants surgical intervention.  The patient voiced understanding of the risks, benefits, and alternative forms of treatment that were discussed and the patient consents to proceed with open reduction internal fixation.  We need to allow his swelling to subside.  I estimate that this will take 7-10 days.  I placed him in a boot so that he can get access to his ankle for icing.  I instructed him to continue strict use of the boot and  nonweightbearing.  We will look at getting him scheduled for the surgery late next week or early the week after.    Date: 4/16/2017    Andrew Ag MD

## 2017-04-18 DIAGNOSIS — S82.892D CLOSED LEFT ANKLE FRACTURE, WITH ROUTINE HEALING, SUBSEQUENT ENCOUNTER: Primary | ICD-10-CM

## 2017-04-19 DIAGNOSIS — S82.892D CLOSED LEFT ANKLE FRACTURE, WITH ROUTINE HEALING, SUBSEQUENT ENCOUNTER: ICD-10-CM

## 2017-04-19 RX ORDER — OXYCODONE HYDROCHLORIDE AND ACETAMINOPHEN 5; 325 MG/1; MG/1
2 TABLET ORAL EVERY 4 HOURS PRN
Qty: 50 TABLET | Refills: 0 | Status: SHIPPED | OUTPATIENT
Start: 2017-04-19 | End: 2017-04-19 | Stop reason: SDUPTHER

## 2017-04-19 RX ORDER — OXYCODONE HYDROCHLORIDE AND ACETAMINOPHEN 5; 325 MG/1; MG/1
2 TABLET ORAL EVERY 4 HOURS PRN
Qty: 50 TABLET | Refills: 0 | Status: SHIPPED | OUTPATIENT
Start: 2017-04-19 | End: 2017-04-21 | Stop reason: SDUPTHER

## 2017-04-19 NOTE — TELEPHONE ENCOUNTER
Patient's RX was printed out at the EP office and the patient's girlfriend picked up the RX at the EP office.

## 2017-04-21 ENCOUNTER — APPOINTMENT (OUTPATIENT)
Dept: PREADMISSION TESTING | Facility: HOSPITAL | Age: 37
End: 2017-04-21

## 2017-04-21 VITALS
BODY MASS INDEX: 35.82 KG/M2 | HEART RATE: 60 BPM | OXYGEN SATURATION: 98 % | RESPIRATION RATE: 18 BRPM | TEMPERATURE: 98.8 F | WEIGHT: 294.19 LBS | HEIGHT: 76 IN | SYSTOLIC BLOOD PRESSURE: 110 MMHG | DIASTOLIC BLOOD PRESSURE: 69 MMHG

## 2017-04-21 DIAGNOSIS — S82.892D CLOSED LEFT ANKLE FRACTURE, WITH ROUTINE HEALING, SUBSEQUENT ENCOUNTER: ICD-10-CM

## 2017-04-21 LAB
ANION GAP SERPL CALCULATED.3IONS-SCNC: 11.7 MMOL/L
BUN BLD-MCNC: 16 MG/DL (ref 6–20)
BUN/CREAT SERPL: 18.8 (ref 7–25)
CALCIUM SPEC-SCNC: 9.2 MG/DL (ref 8.6–10.5)
CHLORIDE SERPL-SCNC: 101 MMOL/L (ref 98–107)
CO2 SERPL-SCNC: 27.3 MMOL/L (ref 22–29)
CREAT BLD-MCNC: 0.85 MG/DL (ref 0.76–1.27)
DEPRECATED RDW RBC AUTO: 42.4 FL (ref 37–54)
ERYTHROCYTE [DISTWIDTH] IN BLOOD BY AUTOMATED COUNT: 12.5 % (ref 11.5–14.5)
GFR SERPL CREATININE-BSD FRML MDRD: 101 ML/MIN/1.73
GLUCOSE BLD-MCNC: 91 MG/DL (ref 65–99)
HCT VFR BLD AUTO: 34.1 % (ref 40.4–52.2)
HGB BLD-MCNC: 11.7 G/DL (ref 13.7–17.6)
MCH RBC QN AUTO: 31.5 PG (ref 27–32.7)
MCHC RBC AUTO-ENTMCNC: 34.3 G/DL (ref 32.6–36.4)
MCV RBC AUTO: 91.9 FL (ref 79.8–96.2)
PLATELET # BLD AUTO: 250 10*3/MM3 (ref 140–500)
PMV BLD AUTO: 9.3 FL (ref 6–12)
POTASSIUM BLD-SCNC: 4.6 MMOL/L (ref 3.5–5.2)
RBC # BLD AUTO: 3.71 10*6/MM3 (ref 4.6–6)
SODIUM BLD-SCNC: 140 MMOL/L (ref 136–145)
WBC NRBC COR # BLD: 6.91 10*3/MM3 (ref 4.5–10.7)

## 2017-04-21 PROCEDURE — 80048 BASIC METABOLIC PNL TOTAL CA: CPT

## 2017-04-21 PROCEDURE — 93005 ELECTROCARDIOGRAM TRACING: CPT

## 2017-04-21 PROCEDURE — 36415 COLL VENOUS BLD VENIPUNCTURE: CPT

## 2017-04-21 PROCEDURE — 93010 ELECTROCARDIOGRAM REPORT: CPT | Performed by: INTERNAL MEDICINE

## 2017-04-21 PROCEDURE — 85027 COMPLETE CBC AUTOMATED: CPT

## 2017-04-21 RX ORDER — ALPRAZOLAM 1 MG/1
1 TABLET ORAL 3 TIMES DAILY PRN
COMMUNITY

## 2017-04-21 RX ORDER — OXYCODONE HYDROCHLORIDE AND ACETAMINOPHEN 5; 325 MG/1; MG/1
2 TABLET ORAL EVERY 4 HOURS PRN
Qty: 50 TABLET | Refills: 0 | Status: SHIPPED | OUTPATIENT
Start: 2017-04-21 | End: 2017-05-01

## 2017-04-21 NOTE — DISCHARGE INSTRUCTIONS
ARRIVE DAY OF SURGERY AT 12:00 PM          Take the following medications the morning of surgery with a small sip of water:    CARVEDILOL AND TELMASARTAN    General Instructions:  • Do not eat solid food after midnight the night before surgery.  • You may drink clear liquids day of surgery but must stop at least one hour before your hospital arrival time.  • It is beneficial for you to have a clear drink that contains carbohydrates the day of surgery.  We suggest a 20 ounce bottle of Gatorade or Powerade for non-diabetic patients or a 20 ounce bottle of G2 or Powerade Zero for diabetic patients. (Pediatric patients, are not advised to drink a 20 ounce carbohydrate drink)    Clear liquids are liquids you can see through.  Nothing red in color.     Plain water                               Sports drinks  Sodas                                   Gelatin (Jell-O)  Fruit juices without pulp such as white grape juice and apple juice  Popsicles that contain no fruit or yogurt  Tea or coffee (no cream or milk added)  Gatorade / Powerade  G2 / Powerade Zero    • Infants may have breast milk up to four hours before surgery.  • Infants drinking formula may drink formula up to six hours before surgery.   • Patients who avoid smoking, chewing tobacco and alcohol for 4 weeks prior to surgery have a reduced risk of post-operative complications.  Quit smoking as many days before surgery as you can.  • Do not smoke, use chewing tobacco or drink alcohol the day of surgery.   • If applicable bring your C-PAP/ BI-PAP machine.  • Bring any papers given to you in the doctor’s office.  • Wear clean comfortable clothes and socks.  • Do not wear contact lenses or make-up.  Bring a case for your glasses.   • Bring crutches or walker if applicable.  • Leave all other valuables and jewelry at home.  • The Pre-Admission Testing nurse will instruct you to bring medications if unable to obtain an accurate list in Pre-Admission Testing.             Preventing a Surgical Site Infection:  • For 2 to 3 days before surgery, avoid shaving with a razor because the razor can irritate skin and make it easier to develop an infection.  • The night prior to surgery sleep in a clean bed with clean clothing.  Do not allow pets to sleep with you.  • Shower on the morning of surgery using a fresh bar of anti-bacterial soap (such as Dial) and clean washcloth.  Dry with a clean towel and dress in clean clothing.  • Ask your surgeon if you will be receiving antibiotics prior to surgery.  • Make sure you, your family, and all healthcare providers clean their hands with soap and water or an alcohol based hand  before caring for you or your wound.    Day of surgery:  Upon arrival, a Pre-op nurse and Anesthesiologist will review your health history, obtain vital signs, and answer questions you may have.  The only belongings needed at this time will be your home medications and if applicable your C-PAP/BI-PAP machine.  If you are staying overnight your family can leave the rest of your belongings in the car and bring them to your room later.  A Pre-op nurse will start an IV and you may receive medication in preparation for surgery, including something to help you relax.  Your family will be able to see you in the Pre-op area.  While you are in surgery your family should notify the waiting room  if they leave the waiting room area and provide a contact phone number.    Please be aware that surgery does come with discomfort.  We want to make every effort to control your discomfort so please discuss any uncontrolled symptoms with your nurse.   Your doctor will most likely have prescribed pain medications.      If you are going home after surgery you will receive individualized written care instructions before being discharged.  A responsible adult must drive you to and from the hospital on the day of your surgery and stay with you for 24 hours.    If you  are staying overnight following surgery, you will be transported to your hospital room following the recovery period.  [facility] has all private rooms.    If you have any questions please call Pre-Admission Testing at [phone].  Deductibles and co-payments are collected on the day of service. Please be prepared to pay the required co-pay, deductible or deposit on the day of service as defined by your plan.

## 2017-04-27 ENCOUNTER — ANESTHESIA EVENT (OUTPATIENT)
Dept: PERIOP | Facility: HOSPITAL | Age: 37
End: 2017-04-27

## 2017-04-27 ENCOUNTER — APPOINTMENT (OUTPATIENT)
Dept: GENERAL RADIOLOGY | Facility: HOSPITAL | Age: 37
End: 2017-04-27

## 2017-04-27 ENCOUNTER — HOSPITAL ENCOUNTER (OUTPATIENT)
Facility: HOSPITAL | Age: 37
Setting detail: HOSPITAL OUTPATIENT SURGERY
Discharge: HOME | End: 2017-04-27
Attending: ORTHOPAEDIC SURGERY | Admitting: ORTHOPAEDIC SURGERY

## 2017-04-27 ENCOUNTER — ANESTHESIA (OUTPATIENT)
Dept: PERIOP | Facility: HOSPITAL | Age: 37
End: 2017-04-27

## 2017-04-27 VITALS
RESPIRATION RATE: 18 BRPM | HEIGHT: 76 IN | SYSTOLIC BLOOD PRESSURE: 148 MMHG | OXYGEN SATURATION: 100 % | HEART RATE: 96 BPM | DIASTOLIC BLOOD PRESSURE: 88 MMHG | BODY MASS INDEX: 35.89 KG/M2 | WEIGHT: 294.7 LBS | TEMPERATURE: 98.2 F

## 2017-04-27 DIAGNOSIS — S82.892A CLOSED LEFT ANKLE FRACTURE, INITIAL ENCOUNTER: ICD-10-CM

## 2017-04-27 LAB
ABO GROUP BLD: NORMAL
BLD GP AB SCN SERPL QL: NEGATIVE
RH BLD: NEGATIVE

## 2017-04-27 PROCEDURE — 86901 BLOOD TYPING SEROLOGIC RH(D): CPT | Performed by: ORTHOPAEDIC SURGERY

## 2017-04-27 PROCEDURE — 25010000002 HYDRALAZINE PER 20 MG: Performed by: NURSE ANESTHETIST, CERTIFIED REGISTERED

## 2017-04-27 PROCEDURE — 25010000002 MIDAZOLAM PER 1 MG: Performed by: ANESTHESIOLOGY

## 2017-04-27 PROCEDURE — 25010000003 CEFAZOLIN IN DEXTROSE 2-4 GM/100ML-% SOLUTION: Performed by: ORTHOPAEDIC SURGERY

## 2017-04-27 PROCEDURE — 25010000002 HYDROMORPHONE PER 4 MG: Performed by: NURSE ANESTHETIST, CERTIFIED REGISTERED

## 2017-04-27 PROCEDURE — 27829 TREAT LOWER LEG JOINT: CPT | Performed by: ORTHOPAEDIC SURGERY

## 2017-04-27 PROCEDURE — C1713 ANCHOR/SCREW BN/BN,TIS/BN: HCPCS | Performed by: ORTHOPAEDIC SURGERY

## 2017-04-27 PROCEDURE — 25010000002 FENTANYL CITRATE (PF) 100 MCG/2ML SOLUTION: Performed by: ANESTHESIOLOGY

## 2017-04-27 PROCEDURE — 86850 RBC ANTIBODY SCREEN: CPT | Performed by: ORTHOPAEDIC SURGERY

## 2017-04-27 PROCEDURE — 25010000002 ONDANSETRON PER 1 MG: Performed by: NURSE ANESTHETIST, CERTIFIED REGISTERED

## 2017-04-27 PROCEDURE — 25010000002 FENTANYL CITRATE (PF) 100 MCG/2ML SOLUTION: Performed by: NURSE ANESTHETIST, CERTIFIED REGISTERED

## 2017-04-27 PROCEDURE — 76000 FLUOROSCOPY <1 HR PHYS/QHP: CPT

## 2017-04-27 PROCEDURE — 73610 X-RAY EXAM OF ANKLE: CPT

## 2017-04-27 PROCEDURE — 25010000002 PROPOFOL 10 MG/ML EMULSION: Performed by: NURSE ANESTHETIST, CERTIFIED REGISTERED

## 2017-04-27 PROCEDURE — 25010000002 DEXAMETHASONE PER 1 MG: Performed by: NURSE ANESTHETIST, CERTIFIED REGISTERED

## 2017-04-27 PROCEDURE — 27766 OPTX MEDIAL ANKLE FX: CPT | Performed by: ORTHOPAEDIC SURGERY

## 2017-04-27 PROCEDURE — 86900 BLOOD TYPING SEROLOGIC ABO: CPT | Performed by: ORTHOPAEDIC SURGERY

## 2017-04-27 DEVICE — IMPLANTABLE DEVICE: Type: IMPLANTABLE DEVICE | Site: ANKLE | Status: FUNCTIONAL

## 2017-04-27 DEVICE — KT REPR SYNDESMOSIS ANKL TIGHTROPE KNOTLS SS: Type: IMPLANTABLE DEVICE | Site: ANKLE | Status: FUNCTIONAL

## 2017-04-27 RX ORDER — HYDROMORPHONE HYDROCHLORIDE 1 MG/ML
0.5 INJECTION, SOLUTION INTRAMUSCULAR; INTRAVENOUS; SUBCUTANEOUS
Status: DISCONTINUED | OUTPATIENT
Start: 2017-04-27 | End: 2017-04-27 | Stop reason: HOSPADM

## 2017-04-27 RX ORDER — OXYCODONE AND ACETAMINOPHEN 7.5; 325 MG/1; MG/1
2 TABLET ORAL EVERY 4 HOURS PRN
Qty: 60 TABLET | Refills: 0 | Status: SHIPPED | OUTPATIENT
Start: 2017-04-27 | End: 2017-05-01 | Stop reason: SDUPTHER

## 2017-04-27 RX ORDER — MIDAZOLAM HYDROCHLORIDE 1 MG/ML
1 INJECTION INTRAMUSCULAR; INTRAVENOUS
Status: DISCONTINUED | OUTPATIENT
Start: 2017-04-27 | End: 2017-04-27 | Stop reason: HOSPADM

## 2017-04-27 RX ORDER — SODIUM CHLORIDE 0.9 % (FLUSH) 0.9 %
1-10 SYRINGE (ML) INJECTION AS NEEDED
Status: DISCONTINUED | OUTPATIENT
Start: 2017-04-27 | End: 2017-04-27 | Stop reason: HOSPADM

## 2017-04-27 RX ORDER — ONDANSETRON 2 MG/ML
4 INJECTION INTRAMUSCULAR; INTRAVENOUS ONCE AS NEEDED
Status: DISCONTINUED | OUTPATIENT
Start: 2017-04-27 | End: 2017-04-27 | Stop reason: HOSPADM

## 2017-04-27 RX ORDER — FLUMAZENIL 0.1 MG/ML
0.2 INJECTION INTRAVENOUS AS NEEDED
Status: DISCONTINUED | OUTPATIENT
Start: 2017-04-27 | End: 2017-04-27 | Stop reason: HOSPADM

## 2017-04-27 RX ORDER — PROMETHAZINE HYDROCHLORIDE 25 MG/1
25 SUPPOSITORY RECTAL ONCE AS NEEDED
Status: DISCONTINUED | OUTPATIENT
Start: 2017-04-27 | End: 2017-04-27 | Stop reason: HOSPADM

## 2017-04-27 RX ORDER — PROMETHAZINE HYDROCHLORIDE 25 MG/1
25 TABLET ORAL ONCE AS NEEDED
Status: DISCONTINUED | OUTPATIENT
Start: 2017-04-27 | End: 2017-04-27 | Stop reason: HOSPADM

## 2017-04-27 RX ORDER — PROMETHAZINE HYDROCHLORIDE 25 MG/ML
12.5 INJECTION, SOLUTION INTRAMUSCULAR; INTRAVENOUS ONCE AS NEEDED
Status: DISCONTINUED | OUTPATIENT
Start: 2017-04-27 | End: 2017-04-27 | Stop reason: HOSPADM

## 2017-04-27 RX ORDER — OXYCODONE HCL 10 MG/1
10 TABLET, FILM COATED, EXTENDED RELEASE ORAL ONCE
Status: COMPLETED | OUTPATIENT
Start: 2017-04-27 | End: 2017-04-27

## 2017-04-27 RX ORDER — BUPIVACAINE HYDROCHLORIDE AND EPINEPHRINE 2.5; 5 MG/ML; UG/ML
INJECTION, SOLUTION EPIDURAL; INFILTRATION; INTRACAUDAL; PERINEURAL AS NEEDED
Status: DISCONTINUED | OUTPATIENT
Start: 2017-04-27 | End: 2017-04-27 | Stop reason: SURG

## 2017-04-27 RX ORDER — MAGNESIUM HYDROXIDE 1200 MG/15ML
LIQUID ORAL AS NEEDED
Status: DISCONTINUED | OUTPATIENT
Start: 2017-04-27 | End: 2017-04-27 | Stop reason: HOSPADM

## 2017-04-27 RX ORDER — MIDAZOLAM HYDROCHLORIDE 1 MG/ML
2 INJECTION INTRAMUSCULAR; INTRAVENOUS
Status: DISCONTINUED | OUTPATIENT
Start: 2017-04-27 | End: 2017-04-27 | Stop reason: HOSPADM

## 2017-04-27 RX ORDER — HYDROCODONE BITARTRATE AND ACETAMINOPHEN 7.5; 325 MG/1; MG/1
1 TABLET ORAL ONCE AS NEEDED
Status: DISCONTINUED | OUTPATIENT
Start: 2017-04-27 | End: 2017-04-27 | Stop reason: HOSPADM

## 2017-04-27 RX ORDER — NALOXONE HCL 0.4 MG/ML
0.2 VIAL (ML) INJECTION AS NEEDED
Status: DISCONTINUED | OUTPATIENT
Start: 2017-04-27 | End: 2017-04-27 | Stop reason: HOSPADM

## 2017-04-27 RX ORDER — HYDRALAZINE HYDROCHLORIDE 20 MG/ML
5 INJECTION INTRAMUSCULAR; INTRAVENOUS
Status: DISCONTINUED | OUTPATIENT
Start: 2017-04-27 | End: 2017-04-27 | Stop reason: HOSPADM

## 2017-04-27 RX ORDER — PROPOFOL 10 MG/ML
VIAL (ML) INTRAVENOUS AS NEEDED
Status: DISCONTINUED | OUTPATIENT
Start: 2017-04-27 | End: 2017-04-27 | Stop reason: SURG

## 2017-04-27 RX ORDER — FENTANYL CITRATE 50 UG/ML
50 INJECTION, SOLUTION INTRAMUSCULAR; INTRAVENOUS
Status: DISCONTINUED | OUTPATIENT
Start: 2017-04-27 | End: 2017-04-27 | Stop reason: HOSPADM

## 2017-04-27 RX ORDER — HYDROMORPHONE HCL 110MG/55ML
PATIENT CONTROLLED ANALGESIA SYRINGE INTRAVENOUS AS NEEDED
Status: DISCONTINUED | OUTPATIENT
Start: 2017-04-27 | End: 2017-04-27 | Stop reason: SURG

## 2017-04-27 RX ORDER — PROMETHAZINE HYDROCHLORIDE 12.5 MG/1
12.5 TABLET ORAL EVERY 8 HOURS PRN
Qty: 25 TABLET | Refills: 0 | Status: SHIPPED | OUTPATIENT
Start: 2017-04-27

## 2017-04-27 RX ORDER — LABETALOL HYDROCHLORIDE 5 MG/ML
5 INJECTION, SOLUTION INTRAVENOUS
Status: DISCONTINUED | OUTPATIENT
Start: 2017-04-27 | End: 2017-04-27 | Stop reason: HOSPADM

## 2017-04-27 RX ORDER — DOCUSATE SODIUM 100 MG/1
100 CAPSULE, LIQUID FILLED ORAL 2 TIMES DAILY
Qty: 40 CAPSULE | Refills: 0 | Status: SHIPPED | OUTPATIENT
Start: 2017-04-27 | End: 2017-05-31 | Stop reason: SDUPTHER

## 2017-04-27 RX ORDER — FAMOTIDINE 10 MG/ML
20 INJECTION, SOLUTION INTRAVENOUS ONCE
Status: COMPLETED | OUTPATIENT
Start: 2017-04-27 | End: 2017-04-27

## 2017-04-27 RX ORDER — CEFAZOLIN SODIUM 2 G/100ML
2 INJECTION, SOLUTION INTRAVENOUS ONCE
Status: DISCONTINUED | OUTPATIENT
Start: 2017-04-27 | End: 2017-04-27 | Stop reason: HOSPADM

## 2017-04-27 RX ORDER — FENTANYL CITRATE 50 UG/ML
INJECTION, SOLUTION INTRAMUSCULAR; INTRAVENOUS AS NEEDED
Status: DISCONTINUED | OUTPATIENT
Start: 2017-04-27 | End: 2017-04-27 | Stop reason: SURG

## 2017-04-27 RX ORDER — SODIUM CHLORIDE, SODIUM LACTATE, POTASSIUM CHLORIDE, CALCIUM CHLORIDE 600; 310; 30; 20 MG/100ML; MG/100ML; MG/100ML; MG/100ML
9 INJECTION, SOLUTION INTRAVENOUS CONTINUOUS
Status: DISCONTINUED | OUTPATIENT
Start: 2017-04-27 | End: 2017-04-27 | Stop reason: HOSPADM

## 2017-04-27 RX ORDER — DEXAMETHASONE SODIUM PHOSPHATE 10 MG/ML
INJECTION INTRAMUSCULAR; INTRAVENOUS AS NEEDED
Status: DISCONTINUED | OUTPATIENT
Start: 2017-04-27 | End: 2017-04-27 | Stop reason: SURG

## 2017-04-27 RX ORDER — ONDANSETRON 2 MG/ML
INJECTION INTRAMUSCULAR; INTRAVENOUS AS NEEDED
Status: DISCONTINUED | OUTPATIENT
Start: 2017-04-27 | End: 2017-04-27 | Stop reason: SURG

## 2017-04-27 RX ORDER — PROMETHAZINE HYDROCHLORIDE 25 MG/1
12.5 TABLET ORAL ONCE AS NEEDED
Status: DISCONTINUED | OUTPATIENT
Start: 2017-04-27 | End: 2017-04-27 | Stop reason: HOSPADM

## 2017-04-27 RX ORDER — OXYCODONE AND ACETAMINOPHEN 7.5; 325 MG/1; MG/1
1 TABLET ORAL ONCE AS NEEDED
Status: COMPLETED | OUTPATIENT
Start: 2017-04-27 | End: 2017-04-27

## 2017-04-27 RX ORDER — LIDOCAINE HYDROCHLORIDE 20 MG/ML
INJECTION, SOLUTION INFILTRATION; PERINEURAL AS NEEDED
Status: DISCONTINUED | OUTPATIENT
Start: 2017-04-27 | End: 2017-04-27 | Stop reason: SURG

## 2017-04-27 RX ORDER — HYDRALAZINE HYDROCHLORIDE 20 MG/ML
INJECTION INTRAMUSCULAR; INTRAVENOUS AS NEEDED
Status: DISCONTINUED | OUTPATIENT
Start: 2017-04-27 | End: 2017-04-27 | Stop reason: SURG

## 2017-04-27 RX ORDER — DIPHENHYDRAMINE HYDROCHLORIDE 50 MG/ML
12.5 INJECTION INTRAMUSCULAR; INTRAVENOUS
Status: DISCONTINUED | OUTPATIENT
Start: 2017-04-27 | End: 2017-04-27 | Stop reason: HOSPADM

## 2017-04-27 RX ORDER — CEFAZOLIN SODIUM 2 G/100ML
2 INJECTION, SOLUTION INTRAVENOUS ONCE
Status: COMPLETED | OUTPATIENT
Start: 2017-04-27 | End: 2017-04-27

## 2017-04-27 RX ADMIN — OXYCODONE HYDROCHLORIDE AND ACETAMINOPHEN 1 TABLET: 7.5; 325 TABLET ORAL at 16:43

## 2017-04-27 RX ADMIN — LIDOCAINE HYDROCHLORIDE 60 MG: 20 INJECTION, SOLUTION INFILTRATION; PERINEURAL at 14:25

## 2017-04-27 RX ADMIN — BUPIVACAINE HYDROCHLORIDE AND EPINEPHRINE BITARTRATE 30 ML: 2.5; .0091 INJECTION, SOLUTION EPIDURAL; INFILTRATION; INTRACAUDAL; PERINEURAL at 14:01

## 2017-04-27 RX ADMIN — HYDROMORPHONE HYDROCHLORIDE 0.5 MG: 1 INJECTION, SOLUTION INTRAMUSCULAR; INTRAVENOUS; SUBCUTANEOUS at 16:32

## 2017-04-27 RX ADMIN — PROPOFOL 200 MG: 10 INJECTION, EMULSION INTRAVENOUS at 14:25

## 2017-04-27 RX ADMIN — HYDROMORPHONE HYDROCHLORIDE 0.5 MG: 1 INJECTION, SOLUTION INTRAMUSCULAR; INTRAVENOUS; SUBCUTANEOUS at 16:12

## 2017-04-27 RX ADMIN — HYDRALAZINE HYDROCHLORIDE 5 MG: 20 INJECTION INTRAMUSCULAR; INTRAVENOUS at 16:10

## 2017-04-27 RX ADMIN — SODIUM CHLORIDE, POTASSIUM CHLORIDE, SODIUM LACTATE AND CALCIUM CHLORIDE: 600; 310; 30; 20 INJECTION, SOLUTION INTRAVENOUS at 14:22

## 2017-04-27 RX ADMIN — FENTANYL CITRATE 50 MCG: 50 INJECTION, SOLUTION INTRAMUSCULAR; INTRAVENOUS at 14:40

## 2017-04-27 RX ADMIN — FAMOTIDINE 20 MG: 10 INJECTION, SOLUTION INTRAVENOUS at 13:32

## 2017-04-27 RX ADMIN — HYDROMORPHONE HYDROCHLORIDE 0.5 MG: 2 INJECTION, SOLUTION INTRAMUSCULAR; INTRAVENOUS; SUBCUTANEOUS at 15:15

## 2017-04-27 RX ADMIN — FENTANYL CITRATE 50 MCG: 50 INJECTION, SOLUTION INTRAMUSCULAR; INTRAVENOUS at 14:07

## 2017-04-27 RX ADMIN — MIDAZOLAM 1 MG: 1 INJECTION INTRAMUSCULAR; INTRAVENOUS at 13:32

## 2017-04-27 RX ADMIN — HYDROMORPHONE HYDROCHLORIDE 0.5 MG: 1 INJECTION, SOLUTION INTRAMUSCULAR; INTRAVENOUS; SUBCUTANEOUS at 16:03

## 2017-04-27 RX ADMIN — SODIUM CHLORIDE, POTASSIUM CHLORIDE, SODIUM LACTATE AND CALCIUM CHLORIDE 9 ML/HR: 600; 310; 30; 20 INJECTION, SOLUTION INTRAVENOUS at 13:32

## 2017-04-27 RX ADMIN — MIDAZOLAM 2 MG: 1 INJECTION INTRAMUSCULAR; INTRAVENOUS at 13:57

## 2017-04-27 RX ADMIN — HYDRALAZINE HYDROCHLORIDE 5 MG: 20 INJECTION INTRAMUSCULAR; INTRAVENOUS at 15:00

## 2017-04-27 RX ADMIN — HYDRALAZINE HYDROCHLORIDE 5 MG: 20 INJECTION INTRAMUSCULAR; INTRAVENOUS at 15:30

## 2017-04-27 RX ADMIN — SODIUM CHLORIDE, POTASSIUM CHLORIDE, SODIUM LACTATE AND CALCIUM CHLORIDE: 600; 310; 30; 20 INJECTION, SOLUTION INTRAVENOUS at 15:30

## 2017-04-27 RX ADMIN — HYDROMORPHONE HYDROCHLORIDE 0.5 MG: 2 INJECTION, SOLUTION INTRAMUSCULAR; INTRAVENOUS; SUBCUTANEOUS at 15:00

## 2017-04-27 RX ADMIN — HYDROMORPHONE HYDROCHLORIDE 0.5 MG: 2 INJECTION, SOLUTION INTRAMUSCULAR; INTRAVENOUS; SUBCUTANEOUS at 15:30

## 2017-04-27 RX ADMIN — HYDROMORPHONE HYDROCHLORIDE 0.5 MG: 1 INJECTION, SOLUTION INTRAMUSCULAR; INTRAVENOUS; SUBCUTANEOUS at 16:24

## 2017-04-27 RX ADMIN — FENTANYL CITRATE 50 MCG: 50 INJECTION, SOLUTION INTRAMUSCULAR; INTRAVENOUS at 13:57

## 2017-04-27 RX ADMIN — FENTANYL CITRATE 25 MCG: 50 INJECTION, SOLUTION INTRAMUSCULAR; INTRAVENOUS at 14:25

## 2017-04-27 RX ADMIN — CEFAZOLIN SODIUM 2 G: 2 INJECTION, SOLUTION INTRAVENOUS at 14:22

## 2017-04-27 RX ADMIN — ONDANSETRON 4 MG: 2 INJECTION INTRAMUSCULAR; INTRAVENOUS at 14:36

## 2017-04-27 RX ADMIN — FENTANYL CITRATE 50 MCG: 50 INJECTION, SOLUTION INTRAMUSCULAR; INTRAVENOUS at 14:53

## 2017-04-27 RX ADMIN — HYDROMORPHONE HYDROCHLORIDE 0.5 MG: 2 INJECTION, SOLUTION INTRAMUSCULAR; INTRAVENOUS; SUBCUTANEOUS at 15:41

## 2017-04-27 RX ADMIN — DEXAMETHASONE SODIUM PHOSPHATE 8 MG: 10 INJECTION INTRAMUSCULAR; INTRAVENOUS at 14:36

## 2017-04-27 RX ADMIN — OXYCODONE HYDROCHLORIDE 10 MG: 10 TABLET, FILM COATED, EXTENDED RELEASE ORAL at 18:09

## 2017-04-27 RX ADMIN — FENTANYL CITRATE 50 MCG: 50 INJECTION, SOLUTION INTRAMUSCULAR; INTRAVENOUS at 15:00

## 2017-04-27 RX ADMIN — FENTANYL CITRATE 25 MCG: 50 INJECTION, SOLUTION INTRAMUSCULAR; INTRAVENOUS at 14:33

## 2017-04-27 RX ADMIN — BUPIVACAINE HYDROCHLORIDE AND EPINEPHRINE BITARTRATE 30 ML: 2.5; .0091 INJECTION, SOLUTION EPIDURAL; INFILTRATION; INTRACAUDAL; PERINEURAL at 14:09

## 2017-04-27 RX ADMIN — FENTANYL CITRATE 50 MCG: 50 INJECTION, SOLUTION INTRAMUSCULAR; INTRAVENOUS at 14:49

## 2017-04-27 RX ADMIN — PROPOFOL 150 MG: 10 INJECTION, EMULSION INTRAVENOUS at 14:26

## 2017-04-27 NOTE — ANESTHESIA PROCEDURE NOTES
Airway  Date/Time: 4/27/2017 2:28 PM  Airway not difficult    General Information and Staff    Patient location during procedure: OR  Anesthesiologist: DERECK GALVEZ  CRNA: JHONY QUINONEZ    Indications and Patient Condition  Indications for airway management: airway protection    Preoxygenated: yes  MILS not maintained throughout  Mask difficulty assessment: 1 - vent by mask    Final Airway Details  Final airway type: supraglottic airway      Successful airway: classic  Size 5    Number of attempts at approach: 1    Additional Comments  Preoxygenation FEO2 >85, SIVI, LMA placed with ease, teeth/lips as preop. Secured and placement confirmed.

## 2017-04-27 NOTE — ANESTHESIA POSTPROCEDURE EVALUATION
Patient: Evaristo Scherer    Procedure Summary     Date Anesthesia Start Anesthesia Stop Room / Location    04/27/17 1420 1558  [facility] / [facility]       Procedure Diagnosis Surgeon Provider    ANKLE OPEN REDUCTION INTERNAL FIXATION (Left Ankle) Closed left ankle fracture, initial encounter  (Closed left ankle fracture, initial encounter [S82.891J]) MD Bravo Can MD          Anesthesia Type: general, regional  Last vitals  /96 (04/27/17 1619)    Temp 36.7 °C (98 °F) (04/27/17 1557)    Pulse 94 (04/27/17 1619)   Resp 16 (04/27/17 1619)    SpO2 98 % (04/27/17 1619)      Post Anesthesia Care and Evaluation    Patient location during evaluation: PACU  Patient participation: complete - patient participated  Level of consciousness: awake and alert  Pain management: adequate  Airway patency: patent  Anesthetic complications: No anesthetic complications    Cardiovascular status: acceptable  Respiratory status: acceptable  Hydration status: acceptable

## 2017-04-27 NOTE — ANESTHESIA PREPROCEDURE EVALUATION
Anesthesia Evaluation     Patient summary reviewed   NPO Status: > 2 hours   Airway   Mallampati: II  no difficulty expected  Dental - normal exam     Pulmonary - normal exam   (+) sleep apnea (hasn't had sleep study yet),   Smoker: today.  Cardiovascular     ECG reviewed  Patient on routine beta blocker and Beta blocker given within 24 hours of surgery    (+) hypertension, valvular problems/murmurs AI, murmur,     ROS comment:  cardiomegaly    Neuro/Psych  (+) psychiatric history Bipolar,    GI/Hepatic/Renal/Endo    (+) obesity,  GERD,     Musculoskeletal     Abdominal    Substance History      OB/GYN          Other                                  Anesthesia Plan    ASA 3     general and regional   (FeRisks and benefits discussed including bleeding, infection,nerve damage, LA toxicitymoral sciatic )  Anesthetic plan and risks discussed with patient and spouse/significant other.

## 2017-04-27 NOTE — ANESTHESIA PROCEDURE NOTES
Adductor Canal Block    Patient location during procedure: holding area  Reason for block: at surgeon's request and post-op pain management  Performed by  Anesthesiologist: KEY DUPREE  Preanesthetic Checklist  Completed: patient identified, site marked, surgical consent, pre-op evaluation, timeout performed, IV checked, risks and benefits discussed and monitors and equipment checked  Peripheral Block Prep:  Sterile barriers:gloves, mask and sterile barriers  Prep: ChloraPrep  Patient monitoring: blood pressure monitoring, continuous pulse oximetry and EKG  Peripheral Procedure  Sedation:yes  Guidance:ultrasound guided  Images:still images obtained    Laterality:left  Block Type:adductor canal block  Injection Technique:single-shot  Needle Type:echogenic  Needle Gauge:22 G  ULTRASOUND INTERPRETATION.  Using ultrasound guidance a 22 G gauge needle was placed in close proximity to the femoral nerve, at which point, under ultrasound guidance anesthetic was injected in the area of the nerve and spread of the anesthesia was seen on ultrasound in close proximity thereto.  There were no abnormalities seen on ultrasound; a digital image was taken; and the patient tolerated the procedure with no complications.   Medications  Local Injected:bupivacaine 0.25% with epinephrine Local Amount Injected:30mL  Post Assessment  Injection Assessment: negative aspiration for heme, no paresthesia on injection and incremental injection  Patient Tolerance:comfortable throughout block  Complications:no

## 2017-04-27 NOTE — ANESTHESIA PROCEDURE NOTES
Popliteal Block    Patient location during procedure: holding area  Reason for block: at surgeon's request and post-op pain management  Performed by  Anesthesiologist: KEY DUPREE  Preanesthetic Checklist  Completed: patient identified, site marked, surgical consent, pre-op evaluation, timeout performed, IV checked, risks and benefits discussed and monitors and equipment checked  Peripheral Block Prep:  Sterile barriers:gloves, mask and sterile barriers  Prep: ChloraPrep  Patient monitoring: blood pressure monitoring, continuous pulse oximetry and EKG  Peripheral Procedure  Sedation:yes  Guidance:ultrasound guided  Images:still images obtained    Laterality:left  Block Type:popliteal  Injection Technique:single-shot  Needle Type:echogenic  Needle Gauge:22 G  ULTRASOUND INTERPRETATION.  Using ultrasound guidance a 22 G gauge needle was placed in close proximity to the sciatic nerve, at which point, under ultrasound guidance anesthetic was injected in the area of the nerve and spread of the anesthesia was seen on ultrasound in close proximity thereto.  There were no abnormalities seen on ultrasound; a digital image was taken; and the patient tolerated the procedure with no complications.   Medications  Local Injected:bupivacaine 0.25% with epinephrine Local Amount Injected:30mL  Post Assessment  Injection Assessment: negative aspiration for heme, no paresthesia on injection and incremental injection  Patient Tolerance:comfortable throughout block  Complications:no

## 2017-05-01 ENCOUNTER — TELEPHONE (OUTPATIENT)
Dept: ORTHOPEDIC SURGERY | Facility: CLINIC | Age: 37
End: 2017-05-01

## 2017-05-01 DIAGNOSIS — R52 PAIN: Primary | ICD-10-CM

## 2017-05-01 RX ORDER — OXYCODONE AND ACETAMINOPHEN 7.5; 325 MG/1; MG/1
2 TABLET ORAL EVERY 4 HOURS PRN
Qty: 80 TABLET | Refills: 0 | Status: SHIPPED | OUTPATIENT
Start: 2017-05-01 | End: 2017-05-09 | Stop reason: SDUPTHER

## 2017-05-02 ENCOUNTER — TELEPHONE (OUTPATIENT)
Dept: ORTHOPEDIC SURGERY | Facility: CLINIC | Age: 37
End: 2017-05-02

## 2017-05-09 DIAGNOSIS — R52 PAIN: ICD-10-CM

## 2017-05-10 RX ORDER — OXYCODONE AND ACETAMINOPHEN 7.5; 325 MG/1; MG/1
2 TABLET ORAL EVERY 4 HOURS PRN
Qty: 60 TABLET | Refills: 0 | Status: SHIPPED | OUTPATIENT
Start: 2017-05-10 | End: 2017-06-12

## 2017-05-12 ENCOUNTER — OFFICE VISIT (OUTPATIENT)
Dept: ORTHOPEDIC SURGERY | Facility: CLINIC | Age: 37
End: 2017-05-12

## 2017-05-12 VITALS — TEMPERATURE: 97.9 F | BODY MASS INDEX: 34.7 KG/M2 | WEIGHT: 285 LBS | HEIGHT: 76 IN

## 2017-05-12 DIAGNOSIS — Z87.81 STATUS POST OPEN REDUCTION WITH INTERNAL FIXATION (ORIF) OF FRACTURE OF ANKLE: Primary | ICD-10-CM

## 2017-05-12 DIAGNOSIS — Z98.890 STATUS POST OPEN REDUCTION WITH INTERNAL FIXATION (ORIF) OF FRACTURE OF ANKLE: Primary | ICD-10-CM

## 2017-05-12 PROCEDURE — 99024 POSTOP FOLLOW-UP VISIT: CPT | Performed by: ORTHOPAEDIC SURGERY

## 2017-05-12 PROCEDURE — 73610 X-RAY EXAM OF ANKLE: CPT | Performed by: ORTHOPAEDIC SURGERY

## 2017-05-12 PROCEDURE — 29405 APPL SHORT LEG CAST: CPT | Performed by: ORTHOPAEDIC SURGERY

## 2017-05-12 RX ORDER — OXYCODONE AND ACETAMINOPHEN 7.5; 325 MG/1; MG/1
1 TABLET ORAL EVERY 4 HOURS PRN
Qty: 50 TABLET | Refills: 0 | Status: SHIPPED | OUTPATIENT
Start: 2017-05-12 | End: 2017-05-31 | Stop reason: SDUPTHER

## 2017-05-26 ENCOUNTER — TELEPHONE (OUTPATIENT)
Dept: ORTHOPEDIC SURGERY | Facility: CLINIC | Age: 37
End: 2017-05-26

## 2017-05-31 ENCOUNTER — OFFICE VISIT (OUTPATIENT)
Dept: ORTHOPEDIC SURGERY | Facility: CLINIC | Age: 37
End: 2017-05-31

## 2017-05-31 ENCOUNTER — TELEPHONE (OUTPATIENT)
Dept: ORTHOPEDIC SURGERY | Facility: CLINIC | Age: 37
End: 2017-05-31

## 2017-05-31 VITALS — WEIGHT: 280 LBS | TEMPERATURE: 99.1 F | BODY MASS INDEX: 34.1 KG/M2 | HEIGHT: 76 IN

## 2017-05-31 DIAGNOSIS — S82.892D CLOSED LEFT ANKLE FRACTURE, WITH ROUTINE HEALING, SUBSEQUENT ENCOUNTER: Primary | ICD-10-CM

## 2017-05-31 PROCEDURE — 73610 X-RAY EXAM OF ANKLE: CPT | Performed by: ORTHOPAEDIC SURGERY

## 2017-05-31 PROCEDURE — 99024 POSTOP FOLLOW-UP VISIT: CPT | Performed by: ORTHOPAEDIC SURGERY

## 2017-05-31 RX ORDER — OXYCODONE AND ACETAMINOPHEN 7.5; 325 MG/1; MG/1
1 TABLET ORAL EVERY 4 HOURS PRN
Qty: 50 TABLET | Refills: 0 | Status: SHIPPED | OUTPATIENT
Start: 2017-05-31 | End: 2017-06-12

## 2017-06-06 ENCOUNTER — HOSPITAL ENCOUNTER (OUTPATIENT)
Dept: CARDIOLOGY | Facility: HOSPITAL | Age: 37
Discharge: HOME | End: 2017-06-06
Attending: ORTHOPAEDIC SURGERY | Admitting: ORTHOPAEDIC SURGERY

## 2017-06-06 ENCOUNTER — TELEPHONE (OUTPATIENT)
Dept: ORTHOPEDIC SURGERY | Facility: CLINIC | Age: 37
End: 2017-06-06

## 2017-06-06 DIAGNOSIS — M79.609 POSTOPERATIVE PAIN OF EXTREMITY: ICD-10-CM

## 2017-06-06 DIAGNOSIS — M79.89 PAIN AND SWELLING OF LEFT LOWER EXTREMITY: Primary | ICD-10-CM

## 2017-06-06 DIAGNOSIS — G89.18 POSTOPERATIVE PAIN OF EXTREMITY: ICD-10-CM

## 2017-06-06 DIAGNOSIS — M79.605 PAIN AND SWELLING OF LEFT LOWER EXTREMITY: Primary | ICD-10-CM

## 2017-06-06 LAB
BH CV LOWER VASCULAR LEFT COMMON FEMORAL AUGMENT: NORMAL
BH CV LOWER VASCULAR LEFT COMMON FEMORAL COMPETENT: NORMAL
BH CV LOWER VASCULAR LEFT COMMON FEMORAL COMPRESS: NORMAL
BH CV LOWER VASCULAR LEFT COMMON FEMORAL PHASIC: NORMAL
BH CV LOWER VASCULAR LEFT COMMON FEMORAL SPONT: NORMAL
BH CV LOWER VASCULAR LEFT DISTAL FEMORAL COMPRESS: NORMAL
BH CV LOWER VASCULAR LEFT GASTRONEMIUS COMPRESS: NORMAL
BH CV LOWER VASCULAR LEFT GREATER SAPH AK COMPRESS: NORMAL
BH CV LOWER VASCULAR LEFT GREATER SAPH BK COMPRESS: NORMAL
BH CV LOWER VASCULAR LEFT LESSER SAPH COMPRESS: NORMAL
BH CV LOWER VASCULAR LEFT MID FEMORAL AUGMENT: NORMAL
BH CV LOWER VASCULAR LEFT MID FEMORAL COMPETENT: NORMAL
BH CV LOWER VASCULAR LEFT MID FEMORAL COMPRESS: NORMAL
BH CV LOWER VASCULAR LEFT MID FEMORAL PHASIC: NORMAL
BH CV LOWER VASCULAR LEFT MID FEMORAL SPONT: NORMAL
BH CV LOWER VASCULAR LEFT PERONEAL COMPRESS: NORMAL
BH CV LOWER VASCULAR LEFT POPLITEAL AUGMENT: NORMAL
BH CV LOWER VASCULAR LEFT POPLITEAL COMPETENT: NORMAL
BH CV LOWER VASCULAR LEFT POPLITEAL COMPRESS: NORMAL
BH CV LOWER VASCULAR LEFT POPLITEAL PHASIC: NORMAL
BH CV LOWER VASCULAR LEFT POPLITEAL SPONT: NORMAL
BH CV LOWER VASCULAR LEFT POSTERIOR TIBIAL COMPRESS: NORMAL
BH CV LOWER VASCULAR LEFT PROXIMAL FEMORAL COMPRESS: NORMAL
BH CV LOWER VASCULAR LEFT SAPHENOFEMORAL JUNCTION AUGMENT: NORMAL
BH CV LOWER VASCULAR LEFT SAPHENOFEMORAL JUNCTION COMPETENT: NORMAL
BH CV LOWER VASCULAR LEFT SAPHENOFEMORAL JUNCTION COMPRESS: NORMAL
BH CV LOWER VASCULAR LEFT SAPHENOFEMORAL JUNCTION PHASIC: NORMAL
BH CV LOWER VASCULAR LEFT SAPHENOFEMORAL JUNCTION SPONT: NORMAL
BH CV LOWER VASCULAR RIGHT COMMON FEMORAL AUGMENT: NORMAL
BH CV LOWER VASCULAR RIGHT COMMON FEMORAL COMPETENT: NORMAL
BH CV LOWER VASCULAR RIGHT COMMON FEMORAL COMPRESS: NORMAL
BH CV LOWER VASCULAR RIGHT COMMON FEMORAL PHASIC: NORMAL
BH CV LOWER VASCULAR RIGHT COMMON FEMORAL SPONT: NORMAL

## 2017-06-06 PROCEDURE — 93971 EXTREMITY STUDY: CPT

## 2017-06-06 RX ORDER — OXYCODONE HYDROCHLORIDE AND ACETAMINOPHEN 5; 325 MG/1; MG/1
TABLET ORAL
Qty: 50 TABLET | Refills: 0 | Status: SHIPPED | OUTPATIENT
Start: 2017-06-06 | End: 2017-06-12 | Stop reason: SDUPTHER

## 2017-06-06 NOTE — TELEPHONE ENCOUNTER
Have spoken with the pharmacist at [facility] and have okayed for them to go ahead and fill the prescription for Percocet 5-325 milligram tablets.  We are aware that the prescription is 1 day early and that he also has gotten prescriptions from Dr. Reyes in the past.  Yariel Webb

## 2017-06-06 NOTE — TELEPHONE ENCOUNTER
Call return to the patient's significant other.  24-48 hours he's had increasing swelling and discoloration to his left lower extremity and ankle.  He is having increasing pain and is taking his Percocet 2 tablets every 4 hours.  Patient is able to wiggle his toes and denies any numbness or tingling.  He does have severe calf pain.  Have discussed with K Jon and will send him for a Doppler to rule out possible DVT.  He is also asking for refill on his Percocet.  Review of his medications he has been getting hydrocodone from Dr. Reyes as well as Percocet from Dr. Ag.  Discussed with the patient's significant other that he should be taking his prescription as written.  Have also recommended that he only plan to get prescriptions from one physician not multiple.  I have left a message for Dr. Reyes's office to contact me the office.  Dr. Chavez has agreed to one more prescription for Percocet.  I have left a prescription at the  for Percocet 5/325mg, #50, directions her to take 1-2 tablets every 4 hours when necessary pain.

## 2017-06-07 ENCOUNTER — TELEPHONE (OUTPATIENT)
Dept: ORTHOPEDIC SURGERY | Facility: CLINIC | Age: 37
End: 2017-06-07

## 2017-06-07 NOTE — TELEPHONE ENCOUNTER
[place] [facility] [place] me to check on the patient and see how he is doing.      He wants the patient to start wearing Compression stockings.  Also he wants him to ice, elevate and and we need to know if the area is red, inflamed and also hot to the touch to let us know and to  take the pain medication as prescribed!      Also to let him know that the Doppler was negative so that rules out a possible DVT.      Patient did not answer the phone but I did leave a VM to call us back in regards to this.

## 2017-06-08 ENCOUNTER — TELEPHONE (OUTPATIENT)
Dept: ORTHOPEDIC SURGERY | Facility: CLINIC | Age: 37
End: 2017-06-08

## 2017-06-08 NOTE — TELEPHONE ENCOUNTER
Finally was able to speak with Evaristo and let him know what [facility] said and that notation is in the telephone note on 6/7/17

## 2017-06-12 ENCOUNTER — TELEPHONE (OUTPATIENT)
Dept: ORTHOPEDIC SURGERY | Facility: CLINIC | Age: 37
End: 2017-06-12

## 2017-06-12 DIAGNOSIS — G89.18 POSTOPERATIVE PAIN OF EXTREMITY: ICD-10-CM

## 2017-06-12 DIAGNOSIS — M79.609 POSTOPERATIVE PAIN OF EXTREMITY: ICD-10-CM

## 2017-06-12 DIAGNOSIS — M79.89 PAIN AND SWELLING OF LEFT LOWER EXTREMITY: ICD-10-CM

## 2017-06-12 DIAGNOSIS — M79.605 PAIN AND SWELLING OF LEFT LOWER EXTREMITY: ICD-10-CM

## 2017-06-12 RX ORDER — OXYCODONE HYDROCHLORIDE AND ACETAMINOPHEN 5; 325 MG/1; MG/1
TABLET ORAL
Qty: 50 TABLET | Refills: 0 | Status: SHIPPED | OUTPATIENT
Start: 2017-06-12 | End: 2017-06-19 | Stop reason: SDUPTHER

## 2017-06-19 ENCOUNTER — TELEPHONE (OUTPATIENT)
Dept: ORTHOPEDIC SURGERY | Facility: CLINIC | Age: 37
End: 2017-06-19

## 2017-06-19 DIAGNOSIS — M79.609 POSTOPERATIVE PAIN OF EXTREMITY: ICD-10-CM

## 2017-06-19 DIAGNOSIS — M79.89 PAIN AND SWELLING OF LEFT LOWER EXTREMITY: ICD-10-CM

## 2017-06-19 DIAGNOSIS — G89.18 POSTOPERATIVE PAIN OF EXTREMITY: ICD-10-CM

## 2017-06-19 DIAGNOSIS — M79.605 PAIN AND SWELLING OF LEFT LOWER EXTREMITY: ICD-10-CM

## 2017-06-19 RX ORDER — OXYCODONE HYDROCHLORIDE AND ACETAMINOPHEN 5; 325 MG/1; MG/1
TABLET ORAL
Qty: 50 TABLET | Refills: 0 | Status: SHIPPED | OUTPATIENT
Start: 2017-06-19 | End: 2017-06-27 | Stop reason: SDUPTHER

## 2017-06-19 NOTE — TELEPHONE ENCOUNTER
Closing out this encounter and will open a new one so that the RX (if approved by [facility]) will print out at EP

## 2017-06-20 ENCOUNTER — TELEPHONE (OUTPATIENT)
Dept: ORTHOPEDIC SURGERY | Facility: CLINIC | Age: 37
End: 2017-06-20

## 2017-06-20 NOTE — TELEPHONE ENCOUNTER
Have spoken with Dr. Rosenberg Reyes regarding this patient's narcotics.  Would like to avoid the patient and needing narcotics from both offices.  Patient has been on long-term narcotics prior to his surgery that was prescribed by Dr. Reyes.  Have advised him that Dr. Ag has agreed to prescribe narcotics for this patient are total of 3 months from the time of his surgery.  After that all pain medicines will need to be obtained through Dr. Reyes's office.  Dr. Reyes agrees

## 2017-06-27 ENCOUNTER — TELEPHONE (OUTPATIENT)
Dept: ORTHOPEDIC SURGERY | Facility: CLINIC | Age: 37
End: 2017-06-27

## 2017-06-27 RX ORDER — OXYCODONE HYDROCHLORIDE AND ACETAMINOPHEN 5; 325 MG/1; MG/1
1 TABLET ORAL EVERY 4 HOURS PRN
Qty: 50 TABLET | Refills: 0 | OUTPATIENT
Start: 2017-06-27 | End: 2019-01-12

## 2017-06-27 NOTE — TELEPHONE ENCOUNTER
Christy is running [facility] report and will be scanned in to the system. Please resubmit so that [facility] can print and sign at EP.

## 2017-06-28 ENCOUNTER — TELEPHONE (OUTPATIENT)
Dept: ORTHOPEDIC SURGERY | Facility: CLINIC | Age: 37
End: 2017-06-28

## 2017-07-12 ENCOUNTER — TELEPHONE (OUTPATIENT)
Dept: ORTHOPEDIC SURGERY | Facility: CLINIC | Age: 37
End: 2017-07-12

## 2018-12-01 ENCOUNTER — HOSPITAL ENCOUNTER (EMERGENCY)
Facility: HOSPITAL | Age: 38
Discharge: HOME | End: 2018-12-01
Attending: EMERGENCY MEDICINE | Admitting: EMERGENCY MEDICINE

## 2018-12-01 ENCOUNTER — APPOINTMENT (OUTPATIENT)
Dept: GENERAL RADIOLOGY | Facility: HOSPITAL | Age: 38
End: 2018-12-01

## 2018-12-01 VITALS
WEIGHT: 307 LBS | BODY MASS INDEX: 38.17 KG/M2 | TEMPERATURE: 98.4 F | DIASTOLIC BLOOD PRESSURE: 78 MMHG | HEIGHT: 75 IN | OXYGEN SATURATION: 99 % | RESPIRATION RATE: 18 BRPM | HEART RATE: 81 BPM | SYSTOLIC BLOOD PRESSURE: 117 MMHG

## 2018-12-01 DIAGNOSIS — F19.10 SUBSTANCE ABUSE (HCC): ICD-10-CM

## 2018-12-01 DIAGNOSIS — R46.89 AGGRESSIVE BEHAVIOR: ICD-10-CM

## 2018-12-01 DIAGNOSIS — R07.9 CHEST PAIN, UNSPECIFIED TYPE: ICD-10-CM

## 2018-12-01 DIAGNOSIS — F10.920 ALCOHOLIC INTOXICATION WITHOUT COMPLICATION (HCC): Primary | ICD-10-CM

## 2018-12-01 LAB
ALBUMIN SERPL-MCNC: 5 G/DL (ref 3.5–5.2)
ALBUMIN/GLOB SERPL: 1.7 G/DL
ALP SERPL-CCNC: 88 U/L (ref 39–117)
ALT SERPL W P-5'-P-CCNC: 28 U/L (ref 1–41)
AMPHET+METHAMPHET UR QL: NEGATIVE
ANION GAP SERPL CALCULATED.3IONS-SCNC: 13.5 MMOL/L
AST SERPL-CCNC: 29 U/L (ref 1–40)
BARBITURATES UR QL SCN: NEGATIVE
BASOPHILS # BLD AUTO: 0.05 10*3/MM3 (ref 0–0.2)
BASOPHILS NFR BLD AUTO: 0.8 % (ref 0–1.5)
BENZODIAZ UR QL SCN: POSITIVE
BILIRUB SERPL-MCNC: 0.4 MG/DL (ref 0.1–1.2)
BUN BLD-MCNC: 7 MG/DL (ref 6–20)
BUN/CREAT SERPL: 9.1 (ref 7–25)
CALCIUM SPEC-SCNC: 9.7 MG/DL (ref 8.6–10.5)
CANNABINOIDS SERPL QL: POSITIVE
CHLORIDE SERPL-SCNC: 108 MMOL/L (ref 98–107)
CO2 SERPL-SCNC: 27.5 MMOL/L (ref 22–29)
COCAINE UR QL: NEGATIVE
CREAT BLD-MCNC: 0.77 MG/DL (ref 0.76–1.27)
DEPRECATED RDW RBC AUTO: 42.9 FL (ref 37–54)
EOSINOPHIL # BLD AUTO: 0.35 10*3/MM3 (ref 0–0.7)
EOSINOPHIL NFR BLD AUTO: 5.4 % (ref 0.3–6.2)
ERYTHROCYTE [DISTWIDTH] IN BLOOD BY AUTOMATED COUNT: 12.9 % (ref 11.5–14.5)
ETHANOL BLD-MCNC: 190 MG/DL (ref 0–10)
ETHANOL UR QL: 0.19 %
GFR SERPL CREATININE-BSD FRML MDRD: 112 ML/MIN/1.73
GLOBULIN UR ELPH-MCNC: 3 GM/DL
GLUCOSE BLD-MCNC: 88 MG/DL (ref 65–99)
HCT VFR BLD AUTO: 42.2 % (ref 40.4–52.2)
HGB BLD-MCNC: 14 G/DL (ref 13.7–17.6)
IMM GRANULOCYTES # BLD: 0 10*3/MM3 (ref 0–0.03)
IMM GRANULOCYTES NFR BLD: 0 % (ref 0–0.5)
LYMPHOCYTES # BLD AUTO: 2.47 10*3/MM3 (ref 0.9–4.8)
LYMPHOCYTES NFR BLD AUTO: 37.9 % (ref 19.6–45.3)
MCH RBC QN AUTO: 30.2 PG (ref 27–32.7)
MCHC RBC AUTO-ENTMCNC: 33.2 G/DL (ref 32.6–36.4)
MCV RBC AUTO: 90.9 FL (ref 79.8–96.2)
METHADONE UR QL SCN: NEGATIVE
MONOCYTES # BLD AUTO: 0.43 10*3/MM3 (ref 0.2–1.2)
MONOCYTES NFR BLD AUTO: 6.6 % (ref 5–12)
NEUTROPHILS # BLD AUTO: 3.21 10*3/MM3 (ref 1.9–8.1)
NEUTROPHILS NFR BLD AUTO: 49.3 % (ref 42.7–76)
OPIATES UR QL: POSITIVE
OXYCODONE UR QL SCN: NEGATIVE
PLATELET # BLD AUTO: 249 10*3/MM3 (ref 140–500)
PMV BLD AUTO: 9.5 FL (ref 6–12)
POTASSIUM BLD-SCNC: 3.4 MMOL/L (ref 3.5–5.2)
PROT SERPL-MCNC: 8 G/DL (ref 6–8.5)
RBC # BLD AUTO: 4.64 10*6/MM3 (ref 4.6–6)
SODIUM BLD-SCNC: 149 MMOL/L (ref 136–145)
TROPONIN T SERPL-MCNC: <0.01 NG/ML (ref 0–0.03)
WBC NRBC COR # BLD: 6.51 10*3/MM3 (ref 4.5–10.7)

## 2018-12-01 PROCEDURE — 80307 DRUG TEST PRSMV CHEM ANLYZR: CPT | Performed by: EMERGENCY MEDICINE

## 2018-12-01 PROCEDURE — 85025 COMPLETE CBC W/AUTO DIFF WBC: CPT | Performed by: EMERGENCY MEDICINE

## 2018-12-01 PROCEDURE — 71046 X-RAY EXAM CHEST 2 VIEWS: CPT

## 2018-12-01 PROCEDURE — 84484 ASSAY OF TROPONIN QUANT: CPT | Performed by: EMERGENCY MEDICINE

## 2018-12-01 PROCEDURE — 99284 EMERGENCY DEPT VISIT MOD MDM: CPT

## 2018-12-01 PROCEDURE — 93010 ELECTROCARDIOGRAM REPORT: CPT | Performed by: INTERNAL MEDICINE

## 2018-12-01 PROCEDURE — 93005 ELECTROCARDIOGRAM TRACING: CPT | Performed by: EMERGENCY MEDICINE

## 2018-12-01 PROCEDURE — 36415 COLL VENOUS BLD VENIPUNCTURE: CPT

## 2018-12-01 PROCEDURE — 80053 COMPREHEN METABOLIC PANEL: CPT | Performed by: EMERGENCY MEDICINE

## 2018-12-01 NOTE — ED PROVIDER NOTES
EMERGENCY DEPARTMENT ENCOUNTER    CHIEF COMPLAINT  Chief Complaint: suicidal ideations  History given by: EMS report  History limited by: patient is uncooperative for exam and HPI  Room Number: HAL/C  PMD: Reyes, Rosenberg Acosta, MD      HPI:  Pt is a 38 y.o. male who presents to the ED via EMS from home after pt's girlfriend called 911 for pt's increased aggressiveness and SI. Upon EMS arrival, pt denied SI, but noticed several of his recently prescribe xanax were missing. Pt is currently uncooperative but does ambulate well within the ED without distress.    Duration/Onset/Timing: pta  Location: n/a  Radiation: n/a  Quality: aggressive, SI  Intensity/Severity: n/a  Associated Symptoms: aggression  Aggravating or Alleviating Factors: n/a  Previous Episodes: unknown      PAST MEDICAL HISTORY  Active Ambulatory Problems     Diagnosis Date Noted   • No Active Ambulatory Problems     Resolved Ambulatory Problems     Diagnosis Date Noted   • No Resolved Ambulatory Problems     Past Medical History:   Diagnosis Date   • Acid reflux    • Aortic valve insufficiency    • Bipolar disorder (CMS/HCC)    • Blistered skin    • Cardiomegaly    • Fracture of ankle    • Hypertension    • Murmur    • Sleep apnea        PAST SURGICAL HISTORY  Past Surgical History:   Procedure Laterality Date   • NO PAST SURGERIES         FAMILY HISTORY  No family history on file.    SOCIAL HISTORY  Social History     Socioeconomic History   • Marital status:      Spouse name: Not on file   • Number of children: Not on file   • Years of education: Not on file   • Highest education level: Not on file   Social Needs   • Financial resource strain: Not on file   • Food insecurity - worry: Not on file   • Food insecurity - inability: Not on file   • Transportation needs - medical: Not on file   • Transportation needs - non-medical: Not on file   Occupational History   • Not on file   Tobacco Use   • Smoking status: Current Every Day Smoker      Packs/day: 0.50     Types: Cigarettes   • Smokeless tobacco: Never Used   Substance and Sexual Activity   • Alcohol use: Yes     Comment: OCC   • Drug use: Yes     Types: Hydrocodone   • Sexual activity: Defer   Other Topics Concern   • Not on file   Social History Narrative   • Not on file       ALLERGIES  Patient has no known allergies.    REVIEW OF SYSTEMS  Review of Systems   Unable to perform ROS: Other (poor historian)   All other systems reviewed and are negative.      PHYSICAL EXAM  ED Triage Vitals [12/01/18 0012]   Temp Heart Rate Resp BP SpO2   98.3 °F (36.8 °C) 96 18 138/90 96 %      Temp src Heart Rate Source Patient Position BP Location FiO2 (%)   Tympanic Monitor -- -- --       Physical Exam   Constitutional: He is well-developed, well-nourished, and in no distress. No distress.   HENT:   Head: Normocephalic and atraumatic.   Eyes: Conjunctivae and EOM are normal.   Pulmonary/Chest: No respiratory distress.   Neurological: He is alert.   Pt ambulates well within in the ED. Exam is limited by pt cooperation.   Skin: No rash noted.   Psychiatric: He is agitated.   Pt is uncooperative.   Nursing note and vitals reviewed.      LAB RESULTS  Lab Results (last 24 hours)     Procedure Component Value Units Date/Time    Urine Drug Screen - Urine, Clean Catch [[id number]]  (Abnormal) Collected:  12/01/18 0031    Specimen:  Urine, Clean Catch Updated:  12/01/18 0104     Amphet/Methamphet, Screen Negative     Barbiturates Screen, Urine Negative     Benzodiazepine Screen, Urine Positive     Cocaine Screen, Urine Negative     Opiate Screen Positive     THC, Screen, Urine Positive     Methadone Screen, Urine Negative     Oxycodone Screen, Urine Negative    Narrative:       Negative Thresholds For Drugs Screened:     Amphetamines               500 ng/ml   Barbiturates               200 ng/ml   Benzodiazepines            100 ng/ml   Cocaine                    300 ng/ml   Methadone                  300 ng/ml   Opiates                     300 ng/ml   Oxycodone                  100 ng/ml   THC                        50 ng/ml    The Normal Value for all drugs tested is negative. This report includes final unconfirmed screening results to be used for medical treatment purposes only. Unconfirmed results must not be used for non-medical purposes such as employment or legal testing. Clinical consideration should be applied to any drug of abuse test, particulary when unconfirmed results are used.    CBC & Differential [[id number]] Collected:  12/01/18 0055    Specimen:  Blood Updated:  12/01/18 0136    Narrative:       The following orders were created for panel order CBC & Differential.  Procedure                               Abnormality         Status                     ---------                               -----------         ------                     CBC Auto Differential[[id number]]        Normal              Final result                 Please view results for these tests on the individual orders.    Comprehensive Metabolic Panel [[id number]]  (Abnormal) Collected:  12/01/18 0055    Specimen:  Blood from Arm, Left Updated:  12/01/18 0159     Glucose 88 mg/dL      BUN 7 mg/dL      Creatinine 0.77 mg/dL      Sodium 149 mmol/L      Potassium 3.4 mmol/L      Chloride 108 mmol/L      CO2 27.5 mmol/L      Calcium 9.7 mg/dL      Total Protein 8.0 g/dL      Albumin 5.00 g/dL      ALT (SGPT) 28 U/L      AST (SGOT) 29 U/L      Alkaline Phosphatase 88 U/L      Total Bilirubin 0.4 mg/dL      eGFR Non African Amer 112 mL/min/1.73      Globulin 3.0 gm/dL      A/G Ratio 1.7 g/dL      BUN/Creatinine Ratio 9.1     Anion Gap 13.5 mmol/L     Ethanol [[id number]]  (Abnormal) Collected:  12/01/18 0055    Specimen:  Blood from Arm, Left Updated:  12/01/18 0159     Ethanol 190 mg/dL      Ethanol % 0.190 %     CBC Auto Differential [[id number]]  (Normal) Collected:  12/01/18 0055    Specimen:  Blood from Arm, Left Updated:  12/01/18 0136     WBC 6.51 10*3/mm3       RBC 4.64 10*6/mm3      Hemoglobin 14.0 g/dL      Hematocrit 42.2 %      MCV 90.9 fL      MCH 30.2 pg      MCHC 33.2 g/dL      RDW 12.9 %      RDW-SD 42.9 fl      MPV 9.5 fL      Platelets 249 10*3/mm3      Neutrophil % 49.3 %      Lymphocyte % 37.9 %      Monocyte % 6.6 %      Eosinophil % 5.4 %      Basophil % 0.8 %      Immature Grans % 0.0 %      Neutrophils, Absolute 3.21 10*3/mm3      Lymphocytes, Absolute 2.47 10*3/mm3      Monocytes, Absolute 0.43 10*3/mm3      Eosinophils, Absolute 0.35 10*3/mm3      Basophils, Absolute 0.05 10*3/mm3      Immature Grans, Absolute 0.00 10*3/mm3     Troponin [[id number]]  (Normal) Collected:  12/01/18 0822    Specimen:  Blood Updated:  12/01/18 0849     Troponin T <0.010 ng/mL     Narrative:       Troponin T Reference Ranges:  Less than 0.03 ng/mL:    Negative for AMI  0.03 to 0.09 ng/mL:      Indeterminant for AMI  Greater than 0.09 ng/mL: Positive for AMI          I ordered the above labs and reviewed the results    PROCEDURES  Procedures      PROGRESS AND CONSULTS     OBSERVATION SERVICES    The patient was admitted to observation status at 0058  in order to evaluate for risk of SI which may require admission.  Family/Past/Social history that is unable to obtain at this time due to being a poor historian.  Please see emergency department note for additional history and physical.      0700  Pt care turned over to Dr. Ibarra, ED provider, pending Access evaluation and disposition.    MEDICAL DECISION MAKING  Results were reviewed/discussed with the patient and they were also made aware of online access. Pt also made aware that some labs, such as cultures, will not be resulted during ER visit and follow up with PMD is necessary.     MDM  Number of Diagnoses or Management Options     Amount and/or Complexity of Data Reviewed  Clinical lab tests: reviewed (UDS positive for THC, benzodiazepines, and opiates)    Patient Progress  Patient progress: stable         DIAGNOSIS  Final  diagnoses:   Alcoholic intoxication without complication (CMS/HCC)   Aggressive behavior   Chest pain, unspecified type   Substance abuse (CMS/[facility])       DISPOSITION  Pt care turned over to Dr. Ibarra, ED provider, pending Access evaluation and disposition.    Latest Documented Vital Signs:  As of 10:50 PM  BP- 117/78 HR- 81 Temp- 98.4 °F (36.9 °C) (Oral) O2 sat- 99%    --  Documentation assistance provided by bogdan Traylor for Dr. Basurto.  Information recorded by the scribe was done at my direction and has been verified and validated by me.     Kari Traylor  12/01/18 1046       Gary Basurto MD  12/01/18 5566

## 2018-12-01 NOTE — ED NOTES
Pt instructed to use charging station in waiting room to charge phone to call for ride.      Lucía Strange RN  12/01/18 0643

## 2018-12-01 NOTE — ED NOTES
"When going over discharge paperwork, patient asked if we figured out what was wrong with him. This RN told patient his chief complaint on arrival was SI/HI/intoxication and he now states that is resolved. Patient goes on to state that he has an enlarged heart and a \"real bad murmur\"     Lucía Strange, RN  12/01/18 5363    "

## 2018-12-01 NOTE — ED NOTES
Discharge delayed due to Pt given paperwork from the police, pt requesting time to read it prior to leaving.  RN agreed.      Lucía Strange, RN  12/01/18 0854

## 2018-12-01 NOTE — ED PROVIDER NOTES
"PROGRESS NOTES:    0700- Pt care taken from Dr. Basurto, ED provider, pending ACCESS evaluation and disposition.     0753- Rechecked pt who is resting comfortably. Pt states he \"feels rough\", but is more alert than previous. Pt denies HI and SI now that pt is sober. Discussed plan of d/c pt home. Pt's agrees and expresses want to be d/c'd home. RTED instructions given. Pt understands and agrees with the plan, all questions answered.    0812- Rechecked with pt who is now c/o new onset CP. Told pt of plan to order labs and EKG for further evaluation of pt condition. Pt understands and agrees with the plan, all questions answered.    0927- Rechecked pt who is resting comfortably. Discussed pt's imaging and lab results with pt. Told him of unremarkable results and plan to d/c pt home. Pt understands and agrees with the plan, all questions answered.    PROCEDURE:  EKG          EKG time: 0819  Rhythm/Rate: NSR 8  P waves and MN: MARIELLA  QRS, axis: nml   ST and T waves: nml     Interpreted Contemporaneously by me, independently viewed  Unchanged compared to prior 4/21/17    LAB RESULTS:  Lab Results (last 24 hours)     Procedure Component Value Units Date/Time    Urine Drug Screen - Urine, Clean Catch [[id number]]  (Abnormal) Collected:  12/01/18 0031    Specimen:  Urine, Clean Catch Updated:  12/01/18 0104     Amphet/Methamphet, Screen Negative     Barbiturates Screen, Urine Negative     Benzodiazepine Screen, Urine Positive     Cocaine Screen, Urine Negative     Opiate Screen Positive     THC, Screen, Urine Positive     Methadone Screen, Urine Negative     Oxycodone Screen, Urine Negative    Narrative:       Negative Thresholds For Drugs Screened:     Amphetamines               500 ng/ml   Barbiturates               200 ng/ml   Benzodiazepines            100 ng/ml   Cocaine                    300 ng/ml   Methadone                  300 ng/ml   Opiates                    300 ng/ml   Oxycodone                  100 ng/ml   THC      "                   50 ng/ml    The Normal Value for all drugs tested is negative. This report includes final unconfirmed screening results to be used for medical treatment purposes only. Unconfirmed results must not be used for non-medical purposes such as employment or legal testing. Clinical consideration should be applied to any drug of abuse test, particulary when unconfirmed results are used.    CBC & Differential [[id number]] Collected:  12/01/18 0055    Specimen:  Blood Updated:  12/01/18 0136    Narrative:       The following orders were created for panel order CBC & Differential.  Procedure                               Abnormality         Status                     ---------                               -----------         ------                     CBC Auto Differential[[id number]]        Normal              Final result                 Please view results for these tests on the individual orders.    Comprehensive Metabolic Panel [[id number]]  (Abnormal) Collected:  12/01/18 0055    Specimen:  Blood from Arm, Left Updated:  12/01/18 0159     Glucose 88 mg/dL      BUN 7 mg/dL      Creatinine 0.77 mg/dL      Sodium 149 mmol/L      Potassium 3.4 mmol/L      Chloride 108 mmol/L      CO2 27.5 mmol/L      Calcium 9.7 mg/dL      Total Protein 8.0 g/dL      Albumin 5.00 g/dL      ALT (SGPT) 28 U/L      AST (SGOT) 29 U/L      Alkaline Phosphatase 88 U/L      Total Bilirubin 0.4 mg/dL      eGFR Non African Amer 112 mL/min/1.73      Globulin 3.0 gm/dL      A/G Ratio 1.7 g/dL      BUN/Creatinine Ratio 9.1     Anion Gap 13.5 mmol/L     Ethanol [[id number]]  (Abnormal) Collected:  12/01/18 0055    Specimen:  Blood from Arm, Left Updated:  12/01/18 0159     Ethanol 190 mg/dL      Ethanol % 0.190 %     CBC Auto Differential [[id number]]  (Normal) Collected:  12/01/18 0055    Specimen:  Blood from Arm, Left Updated:  12/01/18 0136     WBC 6.51 10*3/mm3      RBC 4.64 10*6/mm3      Hemoglobin 14.0 g/dL      Hematocrit 42.2 %       MCV 90.9 fL      MCH 30.2 pg      MCHC 33.2 g/dL      RDW 12.9 %      RDW-SD 42.9 fl      MPV 9.5 fL      Platelets 249 10*3/mm3      Neutrophil % 49.3 %      Lymphocyte % 37.9 %      Monocyte % 6.6 %      Eosinophil % 5.4 %      Basophil % 0.8 %      Immature Grans % 0.0 %      Neutrophils, Absolute 3.21 10*3/mm3      Lymphocytes, Absolute 2.47 10*3/mm3      Monocytes, Absolute 0.43 10*3/mm3      Eosinophils, Absolute 0.35 10*3/mm3      Basophils, Absolute 0.05 10*3/mm3      Immature Grans, Absolute 0.00 10*3/mm3     Troponin [[id number]]  (Normal) Collected:  12/01/18 0822    Specimen:  Blood Updated:  12/01/18 0849     Troponin T <0.010 ng/mL     Narrative:       Troponin T Reference Ranges:  Less than 0.03 ng/mL:    Negative for AMI  0.03 to 0.09 ng/mL:      Indeterminant for AMI  Greater than 0.09 ng/mL: Positive for AMI        I have reviewed the above results.         DISPOSITION:  DISCHARGE    Patient discharged in stable condition.    Reviewed implications of results, diagnosis, meds, responsibility to follow up, warning signs and symptoms of possible worsening, potential complications and reasons to return to ER.    Patient/Family voiced understanding of above instructions.    Discussed plan for discharge, as there is no emergent indication for admission. Patient referred to primary care provider for BP management due to today's BP. Pt/family is agreeable and understands need for follow up and repeat testing.  Pt is aware that discharge does not mean that nothing is wrong but it indicates no emergency is present that requires admission and they must continue care with follow-up as given below or physician of their choice.     FOLLOW-UP  Reyes, Rosenberg Acosta, MD  [address] [place] [address]  [phone]    Schedule an appointment as soon as possible for a visit   As needed         Medication List      No changes were made to your prescriptions during this visit.         Documentation  assistance provided by bogdan Wang for Dr. Fred MD.  Information recorded by the mohinderibe was done at my direction and has been verified and validated by me.       Jaiden Wang  12/01/18 2446       Reji Ibarra II, MD  12/01/18 5253

## 2018-12-01 NOTE — NURSING NOTE
Remains somnolent, difficult to arouse. Pt has an empty bottle of hydrocodone/acetaminophen 7.5/325 mg that he arrived with (in belongings bag in ED), appropriately labelled with pt's name, quantity #120 tabs, dispensed on 11/26/18 (arrived at 00:08 on 12/1/18 barely 5 days later). Pt was also reportedly served EPO by law enforcement last night, paperwork at bedside, 1:1 PSA Macario reports to me that pt did not appear able to process the information provided by law enforcement at that time.     Case discussed with Dr. Basurto, who agreed to hold pt in ED on observation until he is more sober from apparent polysubstance abuse.

## 2019-01-11 LAB
BASOPHILS # BLD AUTO: 0.02 10*3/MM3 (ref 0–0.2)
BASOPHILS NFR BLD AUTO: 0.2 % (ref 0–1.5)
DEPRECATED RDW RBC AUTO: 41.7 FL (ref 37–54)
EOSINOPHIL # BLD AUTO: 0.16 10*3/MM3 (ref 0–0.7)
EOSINOPHIL NFR BLD AUTO: 2 % (ref 0.3–6.2)
ERYTHROCYTE [DISTWIDTH] IN BLOOD BY AUTOMATED COUNT: 13 % (ref 11.5–14.5)
HCT VFR BLD AUTO: 40.6 % (ref 40.4–52.2)
HGB BLD-MCNC: 13.8 G/DL (ref 13.7–17.6)
IMM GRANULOCYTES # BLD AUTO: 0.02 10*3/MM3 (ref 0–0.03)
IMM GRANULOCYTES NFR BLD AUTO: 0.2 % (ref 0–0.5)
LYMPHOCYTES # BLD AUTO: 1.95 10*3/MM3 (ref 0.9–4.8)
LYMPHOCYTES NFR BLD AUTO: 23.9 % (ref 19.6–45.3)
MCH RBC QN AUTO: 30.3 PG (ref 27–32.7)
MCHC RBC AUTO-ENTMCNC: 34 G/DL (ref 32.6–36.4)
MCV RBC AUTO: 89 FL (ref 79.8–96.2)
MONOCYTES # BLD AUTO: 0.61 10*3/MM3 (ref 0.2–1.2)
MONOCYTES NFR BLD AUTO: 7.5 % (ref 5–12)
NEUTROPHILS # BLD AUTO: 5.41 10*3/MM3 (ref 1.9–8.1)
NEUTROPHILS NFR BLD AUTO: 66.2 % (ref 42.7–76)
PLATELET # BLD AUTO: 254 10*3/MM3 (ref 140–500)
PMV BLD AUTO: 9.8 FL (ref 6–12)
RBC # BLD AUTO: 4.56 10*6/MM3 (ref 4.6–6)
WBC NRBC COR # BLD: 8.17 10*3/MM3 (ref 4.5–10.7)

## 2019-01-11 PROCEDURE — 85025 COMPLETE CBC W/AUTO DIFF WBC: CPT

## 2019-01-11 PROCEDURE — 84484 ASSAY OF TROPONIN QUANT: CPT

## 2019-01-11 PROCEDURE — 85379 FIBRIN DEGRADATION QUANT: CPT | Performed by: EMERGENCY MEDICINE

## 2019-01-11 PROCEDURE — 80053 COMPREHEN METABOLIC PANEL: CPT

## 2019-01-11 PROCEDURE — 93010 ELECTROCARDIOGRAM REPORT: CPT | Performed by: INTERNAL MEDICINE

## 2019-01-11 PROCEDURE — 36415 COLL VENOUS BLD VENIPUNCTURE: CPT

## 2019-01-11 PROCEDURE — 99285 EMERGENCY DEPT VISIT HI MDM: CPT

## 2019-01-11 PROCEDURE — 80307 DRUG TEST PRSMV CHEM ANLYZR: CPT | Performed by: EMERGENCY MEDICINE

## 2019-01-11 PROCEDURE — 93005 ELECTROCARDIOGRAM TRACING: CPT

## 2019-01-11 PROCEDURE — 93005 ELECTROCARDIOGRAM TRACING: CPT | Performed by: EMERGENCY MEDICINE

## 2019-01-11 RX ORDER — ASPIRIN 325 MG
325 TABLET ORAL ONCE
Status: COMPLETED | OUTPATIENT
Start: 2019-01-11 | End: 2019-01-12

## 2019-01-11 RX ORDER — SODIUM CHLORIDE 0.9 % (FLUSH) 0.9 %
10 SYRINGE (ML) INJECTION AS NEEDED
Status: DISCONTINUED | OUTPATIENT
Start: 2019-01-11 | End: 2019-01-12 | Stop reason: HOSPADM

## 2019-01-12 ENCOUNTER — APPOINTMENT (OUTPATIENT)
Dept: CT IMAGING | Facility: HOSPITAL | Age: 39
End: 2019-01-12

## 2019-01-12 ENCOUNTER — HOSPITAL ENCOUNTER (EMERGENCY)
Facility: HOSPITAL | Age: 39
Discharge: HOME | End: 2019-01-12
Attending: EMERGENCY MEDICINE | Admitting: EMERGENCY MEDICINE

## 2019-01-12 ENCOUNTER — APPOINTMENT (OUTPATIENT)
Dept: GENERAL RADIOLOGY | Facility: HOSPITAL | Age: 39
End: 2019-01-12

## 2019-01-12 VITALS
HEART RATE: 94 BPM | OXYGEN SATURATION: 99 % | DIASTOLIC BLOOD PRESSURE: 93 MMHG | BODY MASS INDEX: 35.06 KG/M2 | SYSTOLIC BLOOD PRESSURE: 136 MMHG | HEIGHT: 76 IN | TEMPERATURE: 98.8 F | WEIGHT: 287.92 LBS | RESPIRATION RATE: 16 BRPM

## 2019-01-12 VITALS
HEART RATE: 104 BPM | DIASTOLIC BLOOD PRESSURE: 103 MMHG | SYSTOLIC BLOOD PRESSURE: 155 MMHG | BODY MASS INDEX: 34.95 KG/M2 | WEIGHT: 287 LBS | RESPIRATION RATE: 16 BRPM | HEIGHT: 76 IN | TEMPERATURE: 97.8 F | OXYGEN SATURATION: 98 %

## 2019-01-12 DIAGNOSIS — R07.9 CHEST PAIN, UNSPECIFIED TYPE: Primary | ICD-10-CM

## 2019-01-12 DIAGNOSIS — R45.851 SUICIDAL IDEATION: ICD-10-CM

## 2019-01-12 DIAGNOSIS — R45.851 SUICIDAL IDEATION: Primary | ICD-10-CM

## 2019-01-12 LAB
ALBUMIN SERPL-MCNC: 4.3 G/DL (ref 3.5–5.2)
ALBUMIN/GLOB SERPL: 1.5 G/DL
ALP SERPL-CCNC: 79 U/L (ref 39–117)
ALT SERPL W P-5'-P-CCNC: 29 U/L (ref 1–41)
AMPHET+METHAMPHET UR QL: NEGATIVE
ANION GAP SERPL CALCULATED.3IONS-SCNC: 9.9 MMOL/L
AST SERPL-CCNC: 18 U/L (ref 1–40)
BARBITURATES UR QL SCN: NEGATIVE
BENZODIAZ UR QL SCN: POSITIVE
BILIRUB SERPL-MCNC: 0.4 MG/DL (ref 0.1–1.2)
BUN BLD-MCNC: 11 MG/DL (ref 6–20)
BUN/CREAT SERPL: 12.6 (ref 7–25)
CALCIUM SPEC-SCNC: 9.5 MG/DL (ref 8.6–10.5)
CANNABINOIDS SERPL QL: POSITIVE
CHLORIDE SERPL-SCNC: 105 MMOL/L (ref 98–107)
CO2 SERPL-SCNC: 27.1 MMOL/L (ref 22–29)
COCAINE UR QL: NEGATIVE
CREAT BLD-MCNC: 0.87 MG/DL (ref 0.76–1.27)
D DIMER PPP FEU-MCNC: 0.51 MCGFEU/ML (ref 0–0.49)
ETHANOL BLD-MCNC: <10 MG/DL (ref 0–10)
ETHANOL UR QL: <0.01 %
GFR SERPL CREATININE-BSD FRML MDRD: 98 ML/MIN/1.73
GLOBULIN UR ELPH-MCNC: 2.9 GM/DL
GLUCOSE BLD-MCNC: 99 MG/DL (ref 65–99)
HOLD SPECIMEN: NORMAL
HOLD SPECIMEN: NORMAL
METHADONE UR QL SCN: NEGATIVE
OPIATES UR QL: NEGATIVE
OXYCODONE UR QL SCN: NEGATIVE
POTASSIUM BLD-SCNC: 3.9 MMOL/L (ref 3.5–5.2)
PROT SERPL-MCNC: 7.2 G/DL (ref 6–8.5)
SODIUM BLD-SCNC: 142 MMOL/L (ref 136–145)
TROPONIN T SERPL-MCNC: <0.01 NG/ML (ref 0–0.03)
TROPONIN T SERPL-MCNC: <0.01 NG/ML (ref 0–0.03)
WHOLE BLOOD HOLD SPECIMEN: NORMAL
WHOLE BLOOD HOLD SPECIMEN: NORMAL

## 2019-01-12 PROCEDURE — 84484 ASSAY OF TROPONIN QUANT: CPT | Performed by: EMERGENCY MEDICINE

## 2019-01-12 PROCEDURE — 96374 THER/PROPH/DIAG INJ IV PUSH: CPT

## 2019-01-12 PROCEDURE — 80307 DRUG TEST PRSMV CHEM ANLYZR: CPT | Performed by: EMERGENCY MEDICINE

## 2019-01-12 PROCEDURE — 96375 TX/PRO/DX INJ NEW DRUG ADDON: CPT

## 2019-01-12 PROCEDURE — 25010000002 MORPHINE (PF) 10 MG/ML SOLUTION: Performed by: EMERGENCY MEDICINE

## 2019-01-12 PROCEDURE — 71275 CT ANGIOGRAPHY CHEST: CPT

## 2019-01-12 PROCEDURE — 90791 PSYCH DIAGNOSTIC EVALUATION: CPT | Performed by: SOCIAL WORKER

## 2019-01-12 PROCEDURE — 71046 X-RAY EXAM CHEST 2 VIEWS: CPT

## 2019-01-12 PROCEDURE — 0 IOPAMIDOL PER 1 ML: Performed by: EMERGENCY MEDICINE

## 2019-01-12 PROCEDURE — 25010000002 ONDANSETRON PER 1 MG: Performed by: EMERGENCY MEDICINE

## 2019-01-12 PROCEDURE — 99283 EMERGENCY DEPT VISIT LOW MDM: CPT

## 2019-01-12 RX ORDER — NITROGLYCERIN 0.4 MG/1
0.4 TABLET SUBLINGUAL
Status: COMPLETED | OUTPATIENT
Start: 2019-01-12 | End: 2019-01-12

## 2019-01-12 RX ORDER — HYDROCODONE BITARTRATE AND ACETAMINOPHEN 7.5; 325 MG/1; MG/1
1 TABLET ORAL EVERY 12 HOURS PRN
COMMUNITY

## 2019-01-12 RX ORDER — ASPIRIN 81 MG/1
81 TABLET, CHEWABLE ORAL DAILY
Qty: 30 TABLET | Refills: 0 | Status: SHIPPED | OUTPATIENT
Start: 2019-01-12

## 2019-01-12 RX ORDER — ONDANSETRON 2 MG/ML
4 INJECTION INTRAMUSCULAR; INTRAVENOUS ONCE
Status: COMPLETED | OUTPATIENT
Start: 2019-01-12 | End: 2019-01-12

## 2019-01-12 RX ORDER — LISINOPRIL 20 MG/1
20 TABLET ORAL DAILY
COMMUNITY

## 2019-01-12 RX ORDER — MORPHINE SULFATE 2 MG/ML
4 INJECTION, SOLUTION INTRAMUSCULAR; INTRAVENOUS ONCE
Status: COMPLETED | OUTPATIENT
Start: 2019-01-12 | End: 2019-01-12

## 2019-01-12 RX ADMIN — ASPIRIN 325 MG: 325 TABLET ORAL at 02:04

## 2019-01-12 RX ADMIN — NITROGLYCERIN 0.4 MG: 0.4 TABLET SUBLINGUAL at 02:16

## 2019-01-12 RX ADMIN — NITROGLYCERIN 0.4 MG: 0.4 TABLET SUBLINGUAL at 02:22

## 2019-01-12 RX ADMIN — ONDANSETRON 4 MG: 2 INJECTION INTRAMUSCULAR; INTRAVENOUS at 03:15

## 2019-01-12 RX ADMIN — MORPHINE SULFATE 4 MG: 10 INJECTION INTRAVENOUS at 03:15

## 2019-01-12 RX ADMIN — NITROGLYCERIN 1 INCH: 20 OINTMENT TOPICAL at 03:14

## 2019-01-12 RX ADMIN — IOPAMIDOL 95 ML: 755 INJECTION, SOLUTION INTRAVENOUS at 01:36

## 2019-01-12 RX ADMIN — NITROGLYCERIN 0.4 MG: 0.4 TABLET SUBLINGUAL at 02:10

## 2019-01-12 NOTE — ED PROVIDER NOTES
EMERGENCY DEPARTMENT ENCOUNTER    CHIEF COMPLAINT  Chief Complaint: Chest pain  History given by: patient   History limited by: n/a  Room Number: 13/13  PMD: Reyes, Rosenberg Acosta, MD      HPI:  Pt is a 38 y.o. male who presents complaining of chest pain that began 1 hour PTA while walking to the gas station. Pt states that his is a normal amount of exertion for him. reports associated SOA and nausea. He reports a hx o cardiomegaly and aortic valve insufficiency. Paternal hx of CAD at age of 40.     Duration:  1 hour PTA  Onset: gradual  Timing: constant   Location: chest   Radiation: none  Quality: pain  Intensity/Severity: moderate  Progression: unchanged   Associated Symptoms: SOA, nausea  Aggravating Factors: none  Alleviating Factors: none  Previous Episodes: hx of cardiomegaly and aortic valve regurgitation  Treatment before arrival: none    PAST MEDICAL HISTORY  Active Ambulatory Problems     Diagnosis Date Noted   • No Active Ambulatory Problems     Resolved Ambulatory Problems     Diagnosis Date Noted   • No Resolved Ambulatory Problems     Past Medical History:   Diagnosis Date   • Acid reflux    • Aortic valve insufficiency    • Bipolar disorder (CMS/HCC)    • Blistered skin    • Cardiomegaly    • Fracture of ankle    • Hypertension    • Murmur    • Sleep apnea        PAST SURGICAL HISTORY  Past Surgical History:   Procedure Laterality Date   • ANKLE OPEN REDUCTION INTERNAL FIXATION Left 4/27/2017    Procedure: ANKLE OPEN REDUCTION INTERNAL FIXATION;  Surgeon: Andrew Ag MD;  Location: [facility];  Service:    • NO PAST SURGERIES         FAMILY HISTORY  History reviewed. No pertinent family history.    SOCIAL HISTORY  Social History     Socioeconomic History   • Marital status:      Spouse name: Not on file   • Number of children: Not on file   • Years of education: Not on file   • Highest education level: Not on file   Social Needs   • Financial resource strain: Not on file   • Food  insecurity - worry: Not on file   • Food insecurity - inability: Not on file   • Transportation needs - medical: Not on file   • Transportation needs - non-medical: Not on file   Occupational History   • Not on file   Tobacco Use   • Smoking status: Current Every Day Smoker     Packs/day: 0.50     Types: Cigarettes   • Smokeless tobacco: Never Used   Substance and Sexual Activity   • Alcohol use: Yes     Comment: OCC   • Drug use: Yes     Types: Hydrocodone   • Sexual activity: Defer   Other Topics Concern   • Not on file   Social History Narrative   • Not on file       ALLERGIES  Patient has no known allergies.    REVIEW OF SYSTEMS  Review of Systems   Constitutional: Negative for activity change, appetite change and fever.   HENT: Negative for congestion and sore throat.    Eyes: Negative.    Respiratory: Positive for shortness of breath. Negative for cough.    Cardiovascular: Positive for chest pain. Negative for leg swelling.   Gastrointestinal: Positive for nausea. Negative for abdominal pain, diarrhea and vomiting.   Endocrine: Negative.    Genitourinary: Negative for decreased urine volume and dysuria.   Musculoskeletal: Negative for neck pain.   Skin: Negative for rash and wound.   Allergic/Immunologic: Negative.    Neurological: Negative for weakness, numbness and headaches.   Hematological: Negative.    Psychiatric/Behavioral: Negative.    All other systems reviewed and are negative.      PHYSICAL EXAM  ED Triage Vitals   Temp Heart Rate Resp BP SpO2   01/11/19 2246 01/11/19 2246 01/11/19 2246 01/11/19 2246 01/11/19 2246   98.6 °F (37 °C) 100 16 (!) 166/102 99 %      Temp src Heart Rate Source Patient Position BP Location FiO2 (%)   01/11/19 2246 01/11/19 2246 01/12/19 0043 01/12/19 0043 --   Tympanic Monitor Lying Right arm        Physical Exam   Constitutional: He is oriented to person, place, and time. No distress.   HENT:   Head: Normocephalic and atraumatic.   Eyes: EOM are normal. Pupils are equal,  round, and reactive to light.   Neck: Normal range of motion. Neck supple.   Cardiovascular: Normal rate, regular rhythm and normal heart sounds.   Pulmonary/Chest: Effort normal and breath sounds normal. No respiratory distress. He exhibits tenderness (left parasternal tenderness).   Abdominal: Soft. There is no tenderness. There is no rebound and no guarding.   Musculoskeletal: Normal range of motion. He exhibits no edema.   Neurological: He is alert and oriented to person, place, and time. He has normal sensation and normal strength.   Skin: Skin is warm and dry.   Psychiatric: Mood and affect normal.   Nursing note and vitals reviewed.      LAB RESULTS  Lab Results (last 24 hours)     Procedure Component Value Units Date/Time    CBC & Differential [[id number]] Collected:  01/11/19 2331    Specimen:  Blood Updated:  01/11/19 2348    Narrative:       The following orders were created for panel order CBC & Differential.  Procedure                               Abnormality         Status                     ---------                               -----------         ------                     CBC Auto Differential[[id number]]        Abnormal            Final result                 Please view results for these tests on the individual orders.    Comprehensive Metabolic Panel [[id number]] Collected:  01/11/19 2331    Specimen:  Blood Updated:  01/12/19 0006     Glucose 99 mg/dL      BUN 11 mg/dL      Creatinine 0.87 mg/dL      Sodium 142 mmol/L      Potassium 3.9 mmol/L      Chloride 105 mmol/L      CO2 27.1 mmol/L      Calcium 9.5 mg/dL      Total Protein 7.2 g/dL      Albumin 4.30 g/dL      ALT (SGPT) 29 U/L      AST (SGOT) 18 U/L      Alkaline Phosphatase 79 U/L      Total Bilirubin 0.4 mg/dL      eGFR Non African Amer 98 mL/min/1.73      Globulin 2.9 gm/dL      A/G Ratio 1.5 g/dL      BUN/Creatinine Ratio 12.6     Anion Gap 9.9 mmol/L     Troponin [[id number]]  (Normal) Collected:  01/11/19 2331    Specimen:  Blood  Updated:  01/12/19 0013     Troponin T <0.010 ng/mL     Narrative:       Troponin T Reference Ranges:  Less than 0.03 ng/mL:    Negative for AMI  0.03 to 0.09 ng/mL:      Indeterminant for AMI  Greater than 0.09 ng/mL: Positive for AMI    CBC Auto Differential [[id number]]  (Abnormal) Collected:  01/11/19 2331    Specimen:  Blood Updated:  01/11/19 2348     WBC 8.17 10*3/mm3      RBC 4.56 10*6/mm3      Hemoglobin 13.8 g/dL      Hematocrit 40.6 %      MCV 89.0 fL      MCH 30.3 pg      MCHC 34.0 g/dL      RDW 13.0 %      RDW-SD 41.7 fl      MPV 9.8 fL      Platelets 254 10*3/mm3      Neutrophil % 66.2 %      Lymphocyte % 23.9 %      Monocyte % 7.5 %      Eosinophil % 2.0 %      Basophil % 0.2 %      Immature Grans % 0.2 %      Neutrophils, Absolute 5.41 10*3/mm3      Lymphocytes, Absolute 1.95 10*3/mm3      Monocytes, Absolute 0.61 10*3/mm3      Eosinophils, Absolute 0.16 10*3/mm3      Basophils, Absolute 0.02 10*3/mm3      Immature Grans, Absolute 0.02 10*3/mm3     D-dimer, Quantitative [[id number]]  (Abnormal) Collected:  01/11/19 2331    Specimen:  Blood Updated:  01/12/19 0109     D-Dimer, Quantitative 0.51 MCGFEU/mL     Narrative:       The Stago D-Dimer test used in conjunction with a clinical pretest probability (PTP) assessment model, has been approved by the FDA to rule out the presence of venous thromboembolism (VTE) in outpatients suspected of deep venous thrombosis (DVT) or pulmonary embolism (PE).     Ethanol [[id number]] Collected:  01/11/19 2331    Specimen:  Blood Updated:  01/12/19 0218     Ethanol <10 mg/dL      Ethanol % <0.010 %     Troponin [[id number]]  (Normal) Collected:  01/12/19 0228    Specimen:  Blood Updated:  01/12/19 0300     Troponin T <0.010 ng/mL     Narrative:       Troponin T Reference Ranges:  Less than 0.03 ng/mL:    Negative for AMI  0.03 to 0.09 ng/mL:      Indeterminant for AMI  Greater than 0.09 ng/mL: Positive for AMI    Urine Drug Screen - Urine, Clean Catch [[id number]]   (Abnormal) Collected:  01/12/19 0325    Specimen:  Urine, Clean Catch Updated:  01/12/19 0409     Amphet/Methamphet, Screen Negative     Barbiturates Screen, Urine Negative     Benzodiazepine Screen, Urine Positive     Cocaine Screen, Urine Negative     Opiate Screen Negative     THC, Screen, Urine Positive     Methadone Screen, Urine Negative     Oxycodone Screen, Urine Negative    Narrative:       Negative Thresholds For Drugs Screened:     Amphetamines               500 ng/ml   Barbiturates               200 ng/ml   Benzodiazepines            100 ng/ml   Cocaine                    300 ng/ml   Methadone                  300 ng/ml   Opiates                    300 ng/ml   Oxycodone                  100 ng/ml   THC                        50 ng/ml    The Normal Value for all drugs tested is negative. This report includes final unconfirmed screening results to be used for medical treatment purposes only. Unconfirmed results must not be used for non-medical purposes such as employment or legal testing. Clinical consideration should be applied to any drug of abuse test, particulary when unconfirmed results are used.          I ordered the above labs and reviewed the results    RADIOLOGY  CT Angiogram Chest With Contrast   Final Result       1. No acute pulmonary thromboembolus seen.   2. Ectasia of the aortic root and ascending thoracic aorta.   3. Scattered subcentimeter pulmonary nodules, ranging in size from 6 to   7 mm. These are favored to be benign, but short-term CT follow-up in 6   months suggested.       Radiation dose reduction techniques were utilized, including automated   exposure control and exposure modulation based on body size.       This report was finalized on 1/12/2019 2:02 AM by Dr. Brittany Fajardo M.D.          XR Chest 2 View   Final Result   No acute findings.       This report was finalized on 1/12/2019 1:02 AM by Dr. Brittany Fajardo M.D.               I ordered the above noted  radiological studies. Interpreted by radiologist. Reviewed by me in PACS.       PROCEDURES  Procedures    EKG          EKG time: 23:23  Rhythm/Rate: NSR 97  P waves and PA: nml  QRS, axis: nml   ST and T waves: nml     Interpreted Contemporaneously by me, independently viewed  Unchanged compared to prior 12/1/18    PROGRESS AND CONSULTS       00:46  BP- (!) 147/103 HR- 96 Temp- 98.8 °F (37.1 °C) (Oral) O2 sat- 98%  Informed pt that the initial troponin is negative. Plan for repeat troponin and additional labs. Pt understands and agrees with the plan, all questions answered.    00:49  NTG ordered to treat CP.     00:50  D-dimer ordered.     02:30  Repeat troponin ordered.     01:15  CTA chest ordered.     01:33  Per RN, pt now states that he has been having suicidal thoughts. Pt placed on suicide precautions. Call placed to [facility] for consult.     01:43  BP- (!) 158/108 HR- 94 Temp- 98.8 °F (37.1 °C) (Oral) O2 sat- 98%  Rechecked the patient who is in NAD and is resting comfortably. Pt states that he has had suicidal thoughts for the past month, but denies any attempts or plans of hurting himself. Informed pt of the plan for [facility] consult. Pt understands and agrees with the plan, all questions answered.    03:04  Pt with some improvement after NTG. NTG paste ordered.     04:28  Pt has been seen and cleared for discharge by [facility].     04;30  BP- 133/96 HR- 92 Temp- 98.8 °F (37.1 °C) (Oral) O2 sat- 99%  Rechecked the patient who is in NAD and is resting comfortably. Informed pt that the workup in the ED shows NAD. Pt will be discharged and is instructed to take ASA daily. Pt to f/u with cardiology and as per [facility] referrals. Pt understands and agrees with the plan, all questions answered.    MEDICAL DECISION MAKING  Results were reviewed/discussed with the patient and they were also made aware of online access. Pt also made aware that some labs, such as cultures, will not be resulted during ER visit and follow up  with PMD is necessary.     MDM  Number of Diagnoses or Management Options  Chest pain, unspecified type:   Suicidal ideation:      Amount and/or Complexity of Data Reviewed  Clinical lab tests: ordered and reviewed (Troponin is <0.010)  Tests in the radiology section of CPT®: ordered and reviewed (CTA shows no PE. )  Tests in the medicine section of CPT®: ordered and reviewed (See EKG procedure note. )           DIAGNOSIS  Final diagnoses:   Chest pain, unspecified type   Suicidal ideation       DISPOSITION  DISCHARGE    Patient discharged in stable condition.    Reviewed implications of results, diagnosis, meds, responsibility to follow up, warning signs and symptoms of possible worsening, potential complications and reasons to return to ER.  Patient/Family voiced understanding of above instructions.    Discussed plan for discharge, as there is no emergent indication for admission. Patient referred to primary care provider for BP management due to today's BP. Pt/family is agreeable and understands need for follow up and repeat testing.  Pt is aware that discharge does not mean that nothing is wrong but it indicates no emergency is present that requires admission and they must continue care with follow-up as given below or physician of their choice.     FOLLOW-UP  Reyes, Rosenberg Acosta, MD  [address] [place] [id number]  [phone]    Schedule an appointment as soon as possible for a visit       [facility] CARDIOLOGY  [address]  [facility] [address] [phone]  Schedule an appointment as soon as possible for a visit            Medication List      Stop    CIPRO PO     docusate sodium 100 MG capsule  Commonly known as:  COLACE     oxyCODONE-acetaminophen 5-325 MG per tablet  Commonly known as:  PERCOCET            Latest Documented Vital Signs:  As of 4:30 AM  BP- 133/96 HR- 92 Temp- 98.8 °F (37.1 °C) (Oral) O2 sat- 99%    --  Documentation  assistance provided by bogdan Berger for Dr Bender.  Information recorded by the scribneris was done at my direction and has been verified and validated by me.        Marisa Berger  01/12/19 3539       Michael Bender MD  01/12/19 6263

## 2019-01-12 NOTE — CONSULTS
39 y/o cauc male brought to the ED last pm w/ c/o's of CP and fearful that he was having cardiac issues again.  He initially states that he wanted medical care. He later talked about suicidal thoughts. He was seen by Mr Kong [facility] and was discharged from the hospital.  He was to follow up w/ outpatient resources.  Patient never left the facility.  Patient attempted to get a ride and requested assistance w/ transportation.  He had plans to go to [place].  Patient later returned to the triage desk and stated that he had SI.  Thus, [facility] was re consulted to see patient.  Patient states he has had thoughts of OD in past. He reports no specific plan with intent.  He admits he came to the hospital to keep from dying from a heart attack.  He continues to say that he wants to get to [facility] as he is homeless.      Patient reports two prior IP psych treatment. He has been to [facility] in the past.  He has not been w/ [facility] for the past couple years. He states he gets his psych medications, Effexor, Wellbutrin and xanax from his PCP Dr. Rudd.  He has no current mental health provider.    Patient is currently homeless. He reports that he was just released from alf yesterday and was at a service station in [facility] when he had the CP.     Patient was alert and O x 4. He stated he has vague SI thoughts w/o any intent. He does not have access to his medications at this time.  They are at his parents. He states that he wants to go to [place] and he has a female friend that will take him.  He is trying to get in touch w/ her now. Discussed case with Dr. Arauz, psychiarrist on call for the weekend and Dr. Josh MD. Pat is being discharged. He was provided homeless packet as well as number to [facility] and Safety Plan in place.  He verbalized safety plan and recommendations.

## 2019-01-12 NOTE — ED NOTES
"When asked about suicidal intent with plan, pt states: \"I have a few.  I've thought about overdosing.  I thought about walking into traffic while I was waiting out there in the waiting room.\"  Pt affect flat, pt aox4, responds calmly and appropriately to all questions.  Sitter at bedside presently.     Gamal De Jesus RN  01/12/19 5718    "

## 2019-01-12 NOTE — ED NOTES
"Patient found sleeping in ER waiting room 0715 ask patient if he has a ride home states he is waiting on his girlfriend to pick him up would be here shortly. Pt walks outside shortly after. Patient comes back inside approximately 0745 asking if anyone found his phone, given back to patient found in waiting room. Patient goes to sit down in waiting room approximately 5 minutes comes back to triage desk and asks if we have any way of transportation for him, informed patient of cab phone states he does not have money. Informed patient that bus tokens are no longer accepted. Asked patient if girlfriend is coming to get him he states he can not get a hold of her. Patient then states he would like a cab voucher because he is trying to get a ride to  ER. When asked patient why patient states he is suicidal and seen here last night told to follow up outpatient so there was no point for him to be seen here. Charge nurse informed and Con from [facility]. Patient signed in and informed would be re-evaluated. Patient states there is no change from feelings last night when spoke to [facility] \"I don't feel like they took me serious.\"      Ajith Morales RN  01/12/19 0882       Ajith Morales RN  01/12/19 0849    "

## 2019-01-12 NOTE — ED NOTES
EMS picked pt up while walking along the road for about 5 blocks. Pt reports about 45 mins ago that his chest started hurting, SOB and nausea. Pt reports hx of enlarged valve and states that he has not taken his lisinopril for a month.      Yamila Schmidt RN  01/11/19 0942

## 2019-01-12 NOTE — CONSULTS
"Pt reports he was BIB EMS tonight, and that he called them, \"I've got heart issues, and I was having chest pain, shortness of breath ... I was having some shoulder pain, I was concerned I was having a heart attack\". Appears reassured when I discuss his labs with him (unremarakble) indicating appropriate concern for his physical well-being.     Reports he \"was\" having suicidal thoughts tonight. Asked if he is still having them now, \"yeah, to a degree\". Asked how long he has been having these, \"a little over a month\". Reports stressors of, \"I got into it and broke up with my GF, which I lived with her, so pretty much lost my home ... I've got a 3 year no contact order with her\". Pt remembers meeting me on 12/1/19, then reports that \"right after that she called me, I stupidly answered the phone, and then right after that she had me locked up for violating the EPO\". Reports that he then plead guilty to this charge.     Reports that he got out of senior living today, \"I was actually walking to meet my ride when this happened\". Asked where his ride was going to take him, \"to my parents house\". Reports that staying with parents is not a permanent option for him, and that \"I hate to burden them and we don't exactly get along that well ... I was going to go there tonight, because they let me out at 9 oclock at night, and the weather's bad\".     Asked if he has ever acted on thoughts of suicide, \"no, I've never consciously tried to kill myself\". Asked what has prevented him from acting on suicidal thoughts, \"I'm smart enough to try to get help\". Reports that he has had inpatient stays at [facility], [facility], and [facility], last time he was inpatient anywhere was \"no later than 2003\".     Asked if he has ever gotten any outpatient mental health services, \"when I was young we went through [facility]\", reports mom and dad both have mental illness, last outpatient mental health services were in 2001 or 2002. Reports that his suicidal " "thoughts, \"have almost never really gone away\".     Reports \"I was a really bad alcoholic for a really long time\" and that his first drink in over a year was what lead to the argument with his (no ex) GF on 11/30/18, his only drink in past year. Asked if he uses any street drugs, reports \"occaisionally\" marijuana, but none in several months, denies other drugs. Asked if he has ever taken prescription medications other than as prescribed, \"um, not, I may have been hurting and someone gave me a pain pill at some point in the past, but not to the point of abusing them or anything like that\" (contradicting my experience with evaluating this patient per not on 12/1/18).     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     Pt also reports that he had been placing his pay checks in [employer]'s bank account, and that the  told him that he could shala her in small claims court.     Pt engages in discussion with me, about staying at [place] for some time, using his parent's phone/address to put on work applications, and getting his life moving again. After this discussion, pt then reiterates that any time he has been depressed he has been smart enough to get help (indicating he is still actively attempted to convince me to seek admission for him his clear preference).     Reports he has to go back to court and has to do a drug and evaluation and psychological evaluation at [facility] before \"April something\", and plans to do all that, \"oh, yeah absolutely, I don't want any more legal trouble that's for sure, I didn't want this legal trouble\".     Reports started cutting to relieve stress at \"17, 18\", last cut about 5 years ago.     Asked if he has thoughts of harming others, \"I'm concerned about " "it, I've never done anything like that before, but it's very difficult not to think about it. I think that may be more the reason I decided to speak up and say something, because I don't want to hurt anybody.\" Verbalizes understanding of legal consequences should he act on these thoughts.     Denies hx of aggression to others, \"no, especially a woman, I've never hurt a woman or all that\". Asked what the basis of the restraining order was, \"she said I threatened to burn the house down\".     I asked pt if I can call his parents for further background information, pt provides consent for this, Yaritza Catalan, [phone]. Pt then rescinds this permission, citing the late hour, reports parents do not know he is currently in the hospital.     I offered pt a [employer] Guide for homelessness resources, which pt accepted and commented that \"to be honest I don't really want to stay at my parent's house\".     Acting clinical impression, malingering for secondary gains of housing.     Case discussed with Dr. Bender. Plan to d/c with outpatient resources, Dr. Bender agrees with plan.     I asked pt if he would consent to provide ex-GF's name/number to conduct a duty to warn, pt declined to provide this information, stating, \"I figure she would use that against me in some legal\" proceeding, also states he came here partly to prevent himself from acting on these thoughts (appears to be continuing to argue his case for admission, his clear preference tonight). I informed pt I would be calling police to report his threat to them.     I called [facility], spoke with mohini Melvin #[id number] to conduct a duty to warn of pt's threats against his (unnamed) ex-GF.                             "

## 2019-01-12 NOTE — ED NOTES
Report given to Cristopher MONAHAN, [facility] Tim walked patient back and suicide precautions in place      Ajith Morales RN  01/12/19 0853

## 2019-01-12 NOTE — ED PROVIDER NOTES
EMERGENCY DEPARTMENT ENCOUNTER    CHIEF COMPLAINT  Chief Complaint: SI  History given by: Patient   Room Number: 10/10  PMD: Reyes, Rosenberg Acosta, MD      HPI:  Pt is a 38 y.o. male who presents to the ED for evaluation of suicidal thoughts. Patient states he was seen in the ED earlier today and expressed similar thoughts. States he did not feel safe going home, thinks he may hurt himself. Notes no real change since discharge home this morning. Denies having a plan.  Denies HI, A/V hallucinations. No other complaints at this time.     Duration:  1 day  Onset: n/a  Timing: n/a  Location: n/a  Radiation: n/a  Quality: n/a  Intensity/Severity: n/a  Progression: n/a  Associated Symptoms: n/a  Aggravating Factors: n/a  Alleviating Factors: n/a  Previous Episodes: Hx of SI without attempt.   Treatment before arrival: Seen in the ED earlier today and evaluated and cleared by [facility]ESS.     PAST MEDICAL HISTORY  Active Ambulatory Problems     Diagnosis Date Noted   • No Active Ambulatory Problems     Resolved Ambulatory Problems     Diagnosis Date Noted   • No Resolved Ambulatory Problems     Past Medical History:   Diagnosis Date   • Acid reflux    • Aortic valve insufficiency    • Bipolar disorder (CMS/HCC)    • Blistered skin    • Cardiomegaly    • Fracture of ankle    • Hypertension    • Murmur    • Sleep apnea        PAST SURGICAL HISTORY  Past Surgical History:   Procedure Laterality Date   • ANKLE OPEN REDUCTION INTERNAL FIXATION Left 4/27/2017    Procedure: ANKLE OPEN REDUCTION INTERNAL FIXATION;  Surgeon: Andrew Ag MD;  Location: [facility];  Service:    • NO PAST SURGERIES         FAMILY HISTORY  History reviewed. No pertinent family history.    SOCIAL HISTORY  Social History     Socioeconomic History   • Marital status:      Spouse name: Not on file   • Number of children: Not on file   • Years of education: Not on file   • Highest education level: Not on file   Social Needs   • Financial  resource strain: Not on file   • Food insecurity - worry: Not on file   • Food insecurity - inability: Not on file   • Transportation needs - medical: Not on file   • Transportation needs - non-medical: Not on file   Occupational History   • Not on file   Tobacco Use   • Smoking status: Current Every Day Smoker     Packs/day: 0.50     Types: Cigarettes   • Smokeless tobacco: Never Used   Substance and Sexual Activity   • Alcohol use: Yes     Comment: OCC   • Drug use: Yes     Types: Hydrocodone   • Sexual activity: Defer   Other Topics Concern   • Not on file   Social History Narrative   • Not on file       ALLERGIES  Patient has no known allergies.    REVIEW OF SYSTEMS  Review of Systems   Constitutional: Negative for activity change, appetite change and fever.   HENT: Negative for congestion and sore throat.    Eyes: Negative.    Respiratory: Negative for cough and shortness of breath.    Cardiovascular: Negative for chest pain and leg swelling.   Gastrointestinal: Negative for abdominal pain, diarrhea and vomiting.   Endocrine: Negative.    Genitourinary: Negative for decreased urine volume and dysuria.   Musculoskeletal: Negative for neck pain.   Skin: Negative for rash and wound.   Allergic/Immunologic: Negative.    Neurological: Negative for weakness, numbness and headaches.   Hematological: Negative.    Psychiatric/Behavioral: Positive for suicidal ideas.   All other systems reviewed and are negative.      PHYSICAL EXAM  ED Triage Vitals [01/12/19 0819]   Temp Heart Rate Resp BP SpO2   97.8 °F (36.6 °C) 104 16 -- 98 %      Temp src Heart Rate Source Patient Position BP Location FiO2 (%)   Tympanic -- -- -- --       Physical Exam   Constitutional: He is oriented to person, place, and time. No distress.   HENT:   Head: Normocephalic and atraumatic.   Eyes: EOM are normal. Pupils are equal, round, and reactive to light.   Neck: Normal range of motion. Neck supple.   Cardiovascular: Normal rate, regular rhythm  and normal heart sounds.   Pulmonary/Chest: Effort normal and breath sounds normal. No respiratory distress.   Abdominal: Soft. There is no tenderness. There is no rebound and no guarding.   Musculoskeletal: Normal range of motion. He exhibits no edema.   Neurological: He is alert and oriented to person, place, and time. He has normal sensation and normal strength.   Skin: Skin is warm and dry.   Nursing note and vitals reviewed.      LAB RESULTS  Lab Results (last 24 hours)     Procedure Component Value Units Date/Time    CBC & Differential [[id number]] Collected:  01/11/19 2331    Specimen:  Blood Updated:  01/11/19 2348    Narrative:       The following orders were created for panel order CBC & Differential.  Procedure                               Abnormality         Status                     ---------                               -----------         ------                     CBC Auto Differential[[id number]]        Abnormal            Final result                 Please view results for these tests on the individual orders.    Comprehensive Metabolic Panel [[id number]] Collected:  01/11/19 2331    Specimen:  Blood Updated:  01/12/19 0006     Glucose 99 mg/dL      BUN 11 mg/dL      Creatinine 0.87 mg/dL      Sodium 142 mmol/L      Potassium 3.9 mmol/L      Chloride 105 mmol/L      CO2 27.1 mmol/L      Calcium 9.5 mg/dL      Total Protein 7.2 g/dL      Albumin 4.30 g/dL      ALT (SGPT) 29 U/L      AST (SGOT) 18 U/L      Alkaline Phosphatase 79 U/L      Total Bilirubin 0.4 mg/dL      eGFR Non African Amer 98 mL/min/1.73      Globulin 2.9 gm/dL      A/G Ratio 1.5 g/dL      BUN/Creatinine Ratio 12.6     Anion Gap 9.9 mmol/L     Troponin [[id number]]  (Normal) Collected:  01/11/19 2331    Specimen:  Blood Updated:  01/12/19 0013     Troponin T <0.010 ng/mL     Narrative:       Troponin T Reference Ranges:  Less than 0.03 ng/mL:    Negative for AMI  0.03 to 0.09 ng/mL:      Indeterminant for AMI  Greater than 0.09  ng/mL: Positive for AMI    CBC Auto Differential [[id number]]  (Abnormal) Collected:  01/11/19 2331    Specimen:  Blood Updated:  01/11/19 2348     WBC 8.17 10*3/mm3      RBC 4.56 10*6/mm3      Hemoglobin 13.8 g/dL      Hematocrit 40.6 %      MCV 89.0 fL      MCH 30.3 pg      MCHC 34.0 g/dL      RDW 13.0 %      RDW-SD 41.7 fl      MPV 9.8 fL      Platelets 254 10*3/mm3      Neutrophil % 66.2 %      Lymphocyte % 23.9 %      Monocyte % 7.5 %      Eosinophil % 2.0 %      Basophil % 0.2 %      Immature Grans % 0.2 %      Neutrophils, Absolute 5.41 10*3/mm3      Lymphocytes, Absolute 1.95 10*3/mm3      Monocytes, Absolute 0.61 10*3/mm3      Eosinophils, Absolute 0.16 10*3/mm3      Basophils, Absolute 0.02 10*3/mm3      Immature Grans, Absolute 0.02 10*3/mm3     D-dimer, Quantitative [[id number]]  (Abnormal) Collected:  01/11/19 2331    Specimen:  Blood Updated:  01/12/19 0109     D-Dimer, Quantitative 0.51 MCGFEU/mL     Narrative:       The Stago D-Dimer test used in conjunction with a clinical pretest probability (PTP) assessment model, has been approved by the FDA to rule out the presence of venous thromboembolism (VTE) in outpatients suspected of deep venous thrombosis (DVT) or pulmonary embolism (PE).     Ethanol [[id number]] Collected:  01/11/19 2331    Specimen:  Blood Updated:  01/12/19 0218     Ethanol <10 mg/dL      Ethanol % <0.010 %     Troponin [[id number]]  (Normal) Collected:  01/12/19 0228    Specimen:  Blood Updated:  01/12/19 0300     Troponin T <0.010 ng/mL     Narrative:       Troponin T Reference Ranges:  Less than 0.03 ng/mL:    Negative for AMI  0.03 to 0.09 ng/mL:      Indeterminant for AMI  Greater than 0.09 ng/mL: Positive for AMI    Urine Drug Screen - Urine, Clean Catch [[id number]]  (Abnormal) Collected:  01/12/19 0325    Specimen:  Urine, Clean Catch Updated:  01/12/19 0409     Amphet/Methamphet, Screen Negative     Barbiturates Screen, Urine Negative     Benzodiazepine Screen, Urine Positive      "Cocaine Screen, Urine Negative     Opiate Screen Negative     THC, Screen, Urine Positive     Methadone Screen, Urine Negative     Oxycodone Screen, Urine Negative    Narrative:       Negative Thresholds For Drugs Screened:     Amphetamines               500 ng/ml   Barbiturates               200 ng/ml   Benzodiazepines            100 ng/ml   Cocaine                    300 ng/ml   Methadone                  300 ng/ml   Opiates                    300 ng/ml   Oxycodone                  100 ng/ml   THC                        50 ng/ml    The Normal Value for all drugs tested is negative. This report includes final unconfirmed screening results to be used for medical treatment purposes only. Unconfirmed results must not be used for non-medical purposes such as employment or legal testing. Clinical consideration should be applied to any drug of abuse test, particulary when unconfirmed results are used.          I ordered the above labs and reviewed the results    RADIOLOGY  No orders to display        I ordered the above noted radiological studies. Interpreted by radiologist.Reviewed by me in PACS.       PROCEDURES  Procedures      PROGRESS AND CONSULTS  ED Course as of Jan 12 1032   Sat Jan 12, 2019   0941 9:41 AM  Patient seen and re-evaluated by ACCESS counselor.  Case discussed with attending psychiatrist.  Patient is felt to be malingering given that he was released from correction yesterday, is currently without permanent residence and it is snowing outside.  Patient told ACCESS counselor that he can call a \"girlfriend\" for a ride to his parents house.   [WC]      ED Course User Index  [WC] Chintan Moreno MD       0812: Records reviewed. Patient was seen ion the ED earlier this morning for chest pain, had negatvie troponin x2, CTA chest completed and unremarkable, seen by [facility]ESS and cleared, and discharged home @ 0430. ACCESS consult reviewed from this morning     0893: Con from ACCESS at bedside evaluating " patient.    0930: Patient cleared by Con with ACCESS. Will be discharged home to follow up as instructed.     MEDICAL DECISION MAKING  Results were reviewed/discussed with the patient and they were also made aware of online access. Pt also made aware that some labs, such as cultures, will not be resulted during ER visit and follow up with PMD is necessary.     MDM  Number of Diagnoses or Management Options  Suicidal ideation:      Amount and/or Complexity of Data Reviewed  Decide to obtain previous medical records or to obtain history from someone other than the patient: yes  Review and summarize past medical records: yes (Seen in the ED this morning for CP and SI and discharged)  Discuss the patient with other providers: yes (NAOMIE Andujar)           DIAGNOSIS  Final diagnoses:   Suicidal ideation       DISPOSITION  DISCHARGE    Patient discharged in stable condition.    Reviewed implications of results, diagnosis, meds, responsibility to follow up, warning signs and symptoms of possible worsening, potential complications and reasons to return to ER.    Patient/Family voiced understanding of above instructions.    Discussed plan for discharge, as there is no emergent indication for admission. Patient referred to primary care provider for BP management due to today's BP. Pt/family is agreeable and understands need for follow up and repeat testing.  Pt is aware that discharge does not mean that nothing is wrong but it indicates no emergency is present that requires admission and they must continue care with follow-up as given below or physician of their choice.     FOLLOW-UP  Reyes, Rosenberg Acosta, MD  [address] [place] [id number] [phone]    Schedule an appointment as soon as possible for a visit       [address] [address] [phone]  Schedule an appointment as soon as possible for a visit       [facility]  Emergency Department  [address] [facility] [address] [phone]    As needed         Medication List      No changes were made to your prescriptions during this visit.           Latest Documented Vital Signs:  As of 10:32 AM  BP- (!) 155/103 HR- 104 Temp- 97.8 °F (36.6 °C) (Tympanic) O2 sat- 98%    --  Documentation assistance provided by bogdan Cedeno for Dr. Moreno.  Information recorded by the scribe was done at my direction and has been verified and validated by me.            Natalya Cedeno  01/12/19 1030       Chintan Moreno MD  01/12/19 4108

## 2020-01-23 ENCOUNTER — APPOINTMENT (OUTPATIENT)
Dept: GENERAL RADIOLOGY | Facility: HOSPITAL | Age: 40
End: 2020-01-23

## 2020-01-23 ENCOUNTER — HOSPITAL ENCOUNTER (EMERGENCY)
Facility: HOSPITAL | Age: 40
Discharge: HOME | End: 2020-01-23
Attending: EMERGENCY MEDICINE | Admitting: EMERGENCY MEDICINE

## 2020-01-23 VITALS
TEMPERATURE: 97.3 F | OXYGEN SATURATION: 100 % | HEART RATE: 103 BPM | BODY MASS INDEX: 36.53 KG/M2 | DIASTOLIC BLOOD PRESSURE: 87 MMHG | WEIGHT: 300 LBS | HEIGHT: 76 IN | RESPIRATION RATE: 18 BRPM | SYSTOLIC BLOOD PRESSURE: 138 MMHG

## 2020-01-23 DIAGNOSIS — M25.562 ACUTE PAIN OF LEFT KNEE: Primary | ICD-10-CM

## 2020-01-23 PROCEDURE — 73562 X-RAY EXAM OF KNEE 3: CPT

## 2020-01-23 PROCEDURE — 99283 EMERGENCY DEPT VISIT LOW MDM: CPT

## 2020-01-23 RX ORDER — IBUPROFEN 800 MG/1
800 TABLET ORAL ONCE
Status: COMPLETED | OUTPATIENT
Start: 2020-01-23 | End: 2020-01-23

## 2020-01-23 RX ORDER — HYDROCODONE BITARTRATE AND ACETAMINOPHEN 5; 325 MG/1; MG/1
1 TABLET ORAL ONCE
Status: COMPLETED | OUTPATIENT
Start: 2020-01-23 | End: 2020-01-23

## 2020-01-23 RX ORDER — IBUPROFEN 600 MG/1
600 TABLET ORAL EVERY 6 HOURS PRN
Qty: 30 TABLET | Refills: 0 | Status: SHIPPED | OUTPATIENT
Start: 2020-01-23

## 2020-01-23 RX ORDER — ACETAMINOPHEN 500 MG
1000 TABLET ORAL EVERY 8 HOURS PRN
Qty: 30 TABLET | Refills: 0 | Status: SHIPPED | OUTPATIENT
Start: 2020-01-23

## 2020-01-23 RX ADMIN — HYDROCODONE BITARTRATE AND ACETAMINOPHEN 1 TABLET: 5; 325 TABLET ORAL at 15:41

## 2020-01-23 RX ADMIN — IBUPROFEN 800 MG: 800 TABLET, FILM COATED ORAL at 15:41

## 2020-01-23 NOTE — DISCHARGE INSTRUCTIONS
You have been given emergency department evaluation.  This evaluation is intended to rule out life-threatening conditions.  Is not a complete evaluation.  You could require further testing as determined by your primary care physician or any referred specialist.  Please follow-up with all doctors that you are referred to.  Please be sure to take your prescribed medications and follow any specific instructions in the discharge instructions.  Please follow-up with your primary care physician within 48 hours.  Please have your primary care provider recheck your blood pressure.  Please return to the emergency department if you experience chest pain, shortness of breath, abdominal pain, fever greater than 102, intractable vomiting.  Please return to the emergency department if your symptoms continue or worsen, or if you begin to experience any other concerning symptom.

## 2020-01-23 NOTE — ED PROVIDER NOTES
EMERGENCY DEPARTMENT ENCOUNTER    Room Number:  28/28  Date of encounter:  1/23/2020  PCP: Reyes, Rosenberg Acosta, MD  Historian: left knee pain      HPI:  Chief Complaint: knee pain  Context: Evaristo Scherer is a 39 y.o. male who presents to the ED c/o left knee pain that began two weeks ago. Pt states he heard it pop while walking and he has had pain since then. It is worse when walking and bending his knee. It is worse on the medial side. He reports some mild swelling. He reports Hx of left ankle surgery after a fracture a few years ago. He does not have an Orthopedist.        MEDICAL HISTORY REVIEW  Reviewed in EPIC      PAST MEDICAL HISTORY  Active Ambulatory Problems     Diagnosis Date Noted   • No Active Ambulatory Problems     Resolved Ambulatory Problems     Diagnosis Date Noted   • No Resolved Ambulatory Problems     Past Medical History:   Diagnosis Date   • Acid reflux    • Aortic valve insufficiency    • Bipolar disorder (CMS/HCC)    • Blistered skin    • Cardiomegaly    • Fracture of ankle    • Hypertension    • Murmur    • Sleep apnea          PAST SURGICAL HISTORY  Past Surgical History:   Procedure Laterality Date   • ANKLE OPEN REDUCTION INTERNAL FIXATION Left 4/27/2017    Procedure: ANKLE OPEN REDUCTION INTERNAL FIXATION;  Surgeon: Andrew Ag MD;  Location: [facility];  Service:    • NO PAST SURGERIES           FAMILY HISTORY  No family history on file.      SOCIAL HISTORY  Social History     Socioeconomic History   • Marital status:      Spouse name: Not on file   • Number of children: Not on file   • Years of education: Not on file   • Highest education level: Not on file   Tobacco Use   • Smoking status: Current Every Day Smoker     Packs/day: 0.50     Types: Cigarettes   • Smokeless tobacco: Never Used   Substance and Sexual Activity   • Alcohol use: Yes     Comment: OCC   • Drug use: Yes     Types: Hydrocodone   • Sexual activity: Defer         ALLERGIES  Patient has no  known allergies.      REVIEW OF SYSTEMS  Review of Systems   Constitutional: Negative for fever.   HENT: Negative for rhinorrhea, sinus pressure and sore throat.    Respiratory: Negative for cough and shortness of breath.    Cardiovascular: Negative for chest pain.   Gastrointestinal: Negative for abdominal pain, diarrhea, nausea and vomiting.   Genitourinary: Negative for dysuria, frequency and urgency.   Musculoskeletal: Positive for arthralgias (left knee pain for two weeks). Negative for myalgias.   Skin: Negative for rash and wound.   Neurological: Negative for dizziness and headaches.   All other systems reviewed and are negative.       All systems reviewed and negative except for those discussed in HPI.       PHYSICAL EXAM    I have reviewed the triage vital signs and nursing notes.    ED Triage Vitals [01/23/20 1412]   Temp Heart Rate Resp BP SpO2   97.3 °F (36.3 °C) 103 18 -- 100 %      Temp src Heart Rate Source Patient Position BP Location FiO2 (%)   Tympanic Monitor -- -- --       Physical Exam   Constitutional: He is oriented to person, place, and time. He appears well-developed and well-nourished.   HENT:   Head: Normocephalic and atraumatic.   Eyes: Pupils are equal, round, and reactive to light. EOM are normal.   Neck: Normal range of motion. Neck supple.   Cardiovascular: Normal rate, regular rhythm, normal heart sounds and intact distal pulses.   Pulmonary/Chest: Effort normal and breath sounds normal.   Abdominal: Soft. There is no tenderness. There is no rebound and no guarding.   Musculoskeletal: Normal range of motion. He exhibits no edema or deformity.   Left knee: Normal in appearance, no redness warmth or signs of infection.  Questionable trace swelling, no effusion.  Tenderness along medial joint line.  Negative anterior/posterior drawer.  No medial or lateral ligamentous laxity. Ankle/toes up/down, sensation intact light touch all peripheral nerve distributions, 2+ dorsalis pedis and  posterior tibial pulses, brisk cap refill all digits   Neurological: He is alert and oriented to person, place, and time.   Skin: Skin is warm and dry.         RADIOLOGY  Xr Knee 3 View Left    Result Date: 1/23/2020  XR KNEE 3 VW LEFT-  01/23/2020  HISTORY: Left knee pain.  AP lateral and sunrise views of the left knee are submitted. There may be very minimal suprapatellar effusion.  There is mild deformity of the proximal left fibula which may be related to an old fracture. No acute bony abnormalities are seen.      No acute process except for possible very minimal suprapatellar effusion.  This report was finalized on 1/23/2020 3:20 PM by Dr. Isaías Bird M.D.        I ordered the above noted radiological studies. Reviewed by me and discussed with radiologist.  See dictation for official radiology interpretation.      PROCEDURES    Procedures      Splint Application:  Splint Type: left knee immobilizer  Indication: pain  Splint placed by ERT  Post splint application:   1) neurovascularly intact   2) good position  Discussed splint care with patient  Discussed PMD/orthopedic follow up        MEDICATIONS GIVEN IN ER    Medications   ibuprofen (ADVIL,MOTRIN) tablet 800 mg (800 mg Oral Given 1/23/20 1541)   HYDROcodone-acetaminophen (NORCO) 5-325 MG per tablet 1 tablet (1 tablet Oral Given 1/23/20 1541)         PROGRESS, DATA ANALYSIS, CONSULTS, AND MEDICAL DECISION MAKING  MDM      All labs have been independently reviewed by me.  All radiology studies have been reviewed by me and discussed with radiologist dictating the report.  EKG's independently viewed and interpreted by me.  Discussion below represents my analysis of pertinent findings related to patient's condition, differential diagnosis, treatment plan and final disposition.    1520 XR L knee is negative acute. Will discharge the pt home in a knee immobilizer with Rx for Ibuprofen and Tylenol. Will also give the pt crutches for discharge. Advised pt to  f/u with his PCP and Ortho.       AS OF 5:03 PM VITALS:    BP - 138/87  HR - 103  TEMP - 97.3 °F (36.3 °C) (Tympanic)  O2 SATS - 100%        DIAGNOSIS  Final diagnoses:   Acute pain of left knee         DISPOSITION  DISCHARGE    Patient discharged in stable condition.    Reviewed implications of results, diagnosis, meds, responsibility to follow up, warning signs and symptoms of possible worsening, potential complications and reasons to return to ER.    Patient/Family voiced understanding of above instructions.    Discussed plan for discharge, as there is no emergent indication for admission. Patient referred to primary care provider for BP management due to today's BP. Pt/family is agreeable and understands need for follow up and repeat testing.  Pt is aware that discharge does not mean that nothing is wrong but it indicates no emergency is present that requires admission and they must continue care with follow-up as given below or physician of their choice.     FOLLOW-UP  Reyes, Rosenberg Acosta, MD  [address] [facility] [address]  [phone]    Schedule an appointment as soon as possible for a visit in 2 days  even if well    Andrew Ag MD  [address] [address]  [phone]    Schedule an appointment as soon as possible for a visit in 2 days  for further evaluation of your knee pain         Medication List      New Prescriptions    acetaminophen 500 MG tablet  Commonly known as:  TYLENOL  Take 2 tablets by mouth Every 8 (Eight) Hours As Needed for Mild Pain .     ibuprofen 600 MG tablet  Commonly known as:  ADVIL,MOTRIN  Take 1 tablet by mouth Every 6 (Six) Hours As Needed for Mild Pain .                  Documentation assistance provided by bogdan Levi for Dr. Dos Santos.  Information recorded by the bogdan was done at my direction and has been verified and validated by me.              Jaiden Levi  01/23/20 0716       Jefferson Dos Santos MD  01/23/20  1923

## 2020-01-23 NOTE — ED TRIAGE NOTES
"Pt reports left knee pain x 2 weeks that has gotten progressively worse. Pt reports he heard a \"pop\" about 2 weeks ago, and it has progressed to where he can barely tolerate weight on it. Pt is noted to be using a cane to assist him in ambulation at triage desk.   "

## 2020-02-03 ENCOUNTER — OFFICE VISIT (OUTPATIENT)
Dept: ORTHOPEDIC SURGERY | Facility: CLINIC | Age: 40
End: 2020-02-03

## 2020-02-03 VITALS — TEMPERATURE: 97.4 F | BODY MASS INDEX: 36.53 KG/M2 | WEIGHT: 300 LBS | HEIGHT: 76 IN

## 2020-02-03 DIAGNOSIS — M25.561 MECHANICAL KNEE PAIN, RIGHT: ICD-10-CM

## 2020-02-03 DIAGNOSIS — M25.561 ACUTE PAIN OF RIGHT KNEE: Primary | ICD-10-CM

## 2020-02-03 PROCEDURE — 99203 OFFICE O/P NEW LOW 30 MIN: CPT | Performed by: NURSE PRACTITIONER

## 2020-02-03 NOTE — PROGRESS NOTES
Patient Name: Evaristo Scherer   YOB: 1980  Referring Primary Care Physician: Reyes, Rosenberg Acosta, MD  BMI: Body mass index is 36.52 kg/m².    Chief Complaint:    Chief Complaint   Patient presents with   • Left Knee - Pain, Establish Care        HPI: New pt to me presents to clinic with locking and catching of left knee heard a pop while bending and was seen at ER. Pt works as a  and has no hx of prior injury to knee.  He reports planting and twisting and feeling a pop. Ambulating with a cane.    Evaristo Scherer is a 39 y.o. male who presents today for evaluation of   Chief Complaint   Patient presents with   • Left Knee - Pain, Establish Care       This problem is new to this examiner.     Subjective   Medications:   Home Medications:  Current Outpatient Medications on File Prior to Visit   Medication Sig   • acetaminophen (TYLENOL) 500 MG tablet Take 2 tablets by mouth Every 8 (Eight) Hours As Needed for Mild Pain .   • aspirin 81 MG chewable tablet Chew 1 tablet Daily.   • BuPROPion HCl (WELLBUTRIN PO) Take  by mouth Daily.   • Carvedilol (COREG PO) Take 10 mg by mouth 2 (Two) Times a Day.   • Furosemide (LASIX PO) Take  by mouth Daily.   • ibuprofen (ADVIL,MOTRIN) 600 MG tablet Take 1 tablet by mouth Every 6 (Six) Hours As Needed for Mild Pain .   • lisinopril (PRINIVIL,ZESTRIL) 20 MG tablet Take 20 mg by mouth Daily.   • TELMISARTAN PO Take  by mouth Every Morning.   • ALPRAZolam (XANAX) 1 MG tablet Take 1 mg by mouth 3 (Three) Times a Day As Needed for Anxiety.   • HYDROcodone-acetaminophen (NORCO) 7.5-325 MG per tablet Take 1 tablet by mouth Every 12 (Twelve) Hours As Needed for Moderate Pain .   • promethazine (PHENERGAN) 12.5 MG tablet Take 1 tablet by mouth Every 8 (Eight) Hours As Needed for Nausea or Vomiting.     No current facility-administered medications on file prior to visit.      Current Medications:  Scheduled Meds:  Continuous Infusions:  No current  "facility-administered medications for this visit.   PRN Meds:.    I have reviewed the patient's medical history in detail and updated the computerized patient record.  Review and summarization of old records includes:    Past Medical History:   Diagnosis Date   • Acid reflux    • Aortic valve insufficiency    • Bipolar disorder (CMS/HCC)    • Blistered skin     AROUND ANKLE.  ONE WAS \"DRAINED\" THEN STARTED ON ANTIBIOTICS   • Cardiomegaly    • Fracture of ankle    • Hypertension    • Murmur    • Sleep apnea     NO MACHINE YET        Past Surgical History:   Procedure Laterality Date   • ANKLE OPEN REDUCTION INTERNAL FIXATION Left 4/27/2017    Procedure: ANKLE OPEN REDUCTION INTERNAL FIXATION;  Surgeon: Andrew Ag MD;  Location: [facility];  Service:    • NO PAST SURGERIES          Social History     Occupational History   • Not on file   Tobacco Use   • Smoking status: Current Every Day Smoker     Packs/day: 0.50     Types: Cigarettes   • Smokeless tobacco: Never Used   Substance and Sexual Activity   • Alcohol use: Yes     Comment: OCC   • Drug use: Yes     Types: Hydrocodone   • Sexual activity: Defer      Social History     Social History Narrative   • Not on file      History reviewed. No pertinent family history.    ROS: 14 point review of systems was performed and all other systems were reviewed and are negative except for documented findings in HPI and today's encounter.     Allergies: No Known Allergies  Constitutional:  Denies fever, shaking or chills   Eyes:  Denies change in visual acuity   HENT:  Denies nasal congestion or sore throat   Respiratory:  Denies cough or shortness of breath   Cardiovascular:  Denies chest pain or severe LE edema   GI:  Denies abdominal pain, nausea, vomiting, bloody stools or diarrhea   Musculoskeletal:  Numbness, tingling, pain, or loss of motor function only as noted above in history of present illness.  : Denies painful urination or hematuria  Integument:  " "Denies rash, lesion or ulceration   Neurologic:  Denies headache or focal weakness  Endocrine:  Denies lymphadenopathy  Psych:  Denies confusion or change in mental status   Hem:  Denies active bleeding    OBJECTIVE:  Physical Exam:   Temp 97.4 °F (36.3 °C)   Ht 193 cm (76\")   Wt 136 kg (300 lb)   BMI 36.52 kg/m²     General Appearance:    Alert, cooperative, in no acute distress                  Eyes: conjunctiva clear  ENT: external ears and nose atraumatic  CV: no peripheral edema  Resp: normal respiratory effort  Skin: no rashes or wounds; normal turgor  Psych: mood and affect appropriate  Lymph: no nodes appreciated  Neuro: gross sensation intact  Vascular:  Palpable peripheral pulse in noted extremity  Musculoskeletal Extremities: +mcmurrays knee, skin warm, dry and intact, calf soft, hip nttp, medial joint line tenderness    RadioXR KNEE 3 VW LEFT-  01/23/2020     HISTORY: Left knee pain.     AP lateral and sunrise views of the left knee are submitted. There may  be very minimal suprapatellar effusion.     There is mild deformity of the proximal left fibula which may be related  to an old fracture. No acute bony abnormalities are seen.     IMPRESSION:  No acute process except for possible very minimal  suprapatellar effusion.     This report was finalized on 1/23/2020 3:20 PM by Dr. Isaías Bird M.D.     Radiology:       Assessment:     ICD-10-CM ICD-9-CM   1. Acute pain of right knee M25.561 719.46   2. Mechanical knee pain, right M25.561 719.46        Procedures     Will check a mri WITH MECHANICAL SYMPTOMS  Plan: Biomechanics of pertinent body area discussed.  Risks, benefits, alternatives, comparisons, and complications of accepted medicines, injections, recommendations, surgical procedures, and therapies explained and education provided in laymen's terms. Natural history and expected course of this patient's diagnosis discussed along with evaluation of therapies. Questions answered. When " appropriate I also discussed proper use of cane, walker, trekking poles.   BMI:  The concept of BMI body mass index and its importance and implications discussed.  BMI suggested to be < 40 or as low as possible. Lifestyle measures for weight loss and how this affects orthopedic condition.  RICE: Rest, ice, compression, and elevation therapy, Cryotherapy/brachy therapy, and or OTC linaments as indicated with instructions.   MRI.      2/3/2020    Much of this encounter note is an electronic transcription/translation of spoken language to printed text. The electronic translation of spoken language may permit erroneous, or at times, nonsensical words or phrases to be inadvertently transcribed; Although I have reviewed the note for such errors, some may still exist

## 2020-02-07 ENCOUNTER — TELEPHONE (OUTPATIENT)
Dept: ORTHOPEDIC SURGERY | Facility: CLINIC | Age: 40
End: 2020-02-07

## 2020-02-07 NOTE — TELEPHONE ENCOUNTER
[facility] has tried to call patient several times to set up MRI.  Not able to get a hold of patient.  I have tried with no luck.  Just ANASTASIIA

## 2024-11-08 NOTE — ED NOTES
Celexa  Patient due for office visit.  Medication pended.  Please see note below.  Please advise.  PCP Erick.  Thank you.    
Donnella      Last Written Prescription Date: 10/27/17  Last Fill Quantity: 30,  # refills: 0   Last Office Visit with [facility], [facility] or [facility] prescribing provider: 10/17/16                                               
Patient was walking down steps and foot missed part of step and fell down 2-3 steps. Patient has Left ankle pain. Swelling noted. Pedal pulses present.      Simona Mckeon RN  04/11/17 1918    
08-Nov-2024 12:37

## (undated) DEVICE — GLV SURG BIOGEL LTX PF 8 1/2

## (undated) DEVICE — DRAPE,U/ SHT,SPLIT,PLAS,STERIL: Brand: MEDLINE

## (undated) DEVICE — DRP C/ARM 41X74IN

## (undated) DEVICE — APPL CHLORAPREP W/TINT 26ML ORNG

## (undated) DEVICE — SUT VIC 0 CT1 36IN J946H

## (undated) DEVICE — BNDG ELAS CO-FLEX SLF ADHR 4IN5YD LF STRL

## (undated) DEVICE — GLV SURG TRIUMPH CLASSIC PF LTX 8.5 STRL

## (undated) DEVICE — SOL ISO/ALC RUB 70PCT 4OZ

## (undated) DEVICE — SPNG GZ WOVN 4X4IN 12PLY 10/BX STRL

## (undated) DEVICE — DRAPE,REIN 53X77,STERILE: Brand: MEDLINE

## (undated) DEVICE — BNDG ELAS ELITE V/CLOSE 6IN 5YD LF STRL

## (undated) DEVICE — SKIN PREP TRAY W/CHG: Brand: MEDLINE INDUSTRIES, INC.

## (undated) DEVICE — Device

## (undated) DEVICE — PK ORTHO MINOR 40

## (undated) DEVICE — STERILE CAST PADDING KIT: Brand: CARDINAL HEALTH

## (undated) DEVICE — BNDG ELAS ELITE V/CLOSE 4IN 5YD LF STRL

## (undated) DEVICE — SUT MNCRYL 0 CT2 CR8 18IN D8893

## (undated) DEVICE — DRSNG WND GZ CURAD OIL EMULSION 3X8IN LF STRL 1PK

## (undated) DEVICE — BNDG ESMARK 4IN 12FT LF STRL BLU